# Patient Record
Sex: FEMALE | URBAN - METROPOLITAN AREA
[De-identification: names, ages, dates, MRNs, and addresses within clinical notes are randomized per-mention and may not be internally consistent; named-entity substitution may affect disease eponyms.]

---

## 2018-05-01 ENCOUNTER — OUTPATIENT (OUTPATIENT)
Dept: OUTPATIENT SERVICES | Facility: HOSPITAL | Age: 78
LOS: 1 days | End: 2018-05-01

## 2018-05-22 ENCOUNTER — INPATIENT (INPATIENT)
Facility: HOSPITAL | Age: 78
LOS: 2 days | Discharge: HOME | DRG: 392 | End: 2018-05-25
Attending: INTERNAL MEDICINE | Admitting: INTERNAL MEDICINE
Payer: MEDICARE

## 2018-05-22 VITALS
SYSTOLIC BLOOD PRESSURE: 142 MMHG | DIASTOLIC BLOOD PRESSURE: 78 MMHG | HEIGHT: 62 IN | RESPIRATION RATE: 16 BRPM | OXYGEN SATURATION: 99 % | WEIGHT: 164.91 LBS | HEART RATE: 90 BPM | TEMPERATURE: 98 F

## 2018-05-22 DIAGNOSIS — K92.2 GASTROINTESTINAL HEMORRHAGE, UNSPECIFIED: ICD-10-CM

## 2018-05-22 DIAGNOSIS — K52.9 NONINFECTIVE GASTROENTERITIS AND COLITIS, UNSPECIFIED: ICD-10-CM

## 2018-05-22 DIAGNOSIS — Z29.9 ENCOUNTER FOR PROPHYLACTIC MEASURES, UNSPECIFIED: ICD-10-CM

## 2018-05-22 DIAGNOSIS — K62.5 HEMORRHAGE OF ANUS AND RECTUM: ICD-10-CM

## 2018-05-22 DIAGNOSIS — I10 ESSENTIAL (PRIMARY) HYPERTENSION: ICD-10-CM

## 2018-05-22 LAB
ALBUMIN SERPL ELPH-MCNC: 3.3 G/DL — LOW (ref 3.5–5)
ALP SERPL-CCNC: 68 U/L — SIGNIFICANT CHANGE UP (ref 40–120)
ALT FLD-CCNC: 17 U/L DA — SIGNIFICANT CHANGE UP (ref 10–60)
ANION GAP SERPL CALC-SCNC: 5 MMOL/L — SIGNIFICANT CHANGE UP (ref 5–17)
APPEARANCE UR: CLEAR — SIGNIFICANT CHANGE UP
APTT BLD: 28 SEC — SIGNIFICANT CHANGE UP (ref 27.5–37.4)
AST SERPL-CCNC: 22 U/L — SIGNIFICANT CHANGE UP (ref 10–40)
BASOPHILS # BLD AUTO: 0.1 K/UL — SIGNIFICANT CHANGE UP (ref 0–0.2)
BASOPHILS NFR BLD AUTO: 1.4 % — SIGNIFICANT CHANGE UP (ref 0–2)
BILIRUB SERPL-MCNC: 0.4 MG/DL — SIGNIFICANT CHANGE UP (ref 0.2–1.2)
BILIRUB UR-MCNC: NEGATIVE — SIGNIFICANT CHANGE UP
BUN SERPL-MCNC: 15 MG/DL — SIGNIFICANT CHANGE UP (ref 7–18)
CALCIUM SERPL-MCNC: 9.7 MG/DL — SIGNIFICANT CHANGE UP (ref 8.4–10.5)
CHLORIDE SERPL-SCNC: 110 MMOL/L — HIGH (ref 96–108)
CO2 SERPL-SCNC: 25 MMOL/L — SIGNIFICANT CHANGE UP (ref 22–31)
COLOR SPEC: YELLOW — SIGNIFICANT CHANGE UP
CREAT SERPL-MCNC: 1.21 MG/DL — SIGNIFICANT CHANGE UP (ref 0.5–1.3)
DIFF PNL FLD: NEGATIVE — SIGNIFICANT CHANGE UP
EOSINOPHIL # BLD AUTO: 0.1 K/UL — SIGNIFICANT CHANGE UP (ref 0–0.5)
EOSINOPHIL NFR BLD AUTO: 0.7 % — SIGNIFICANT CHANGE UP (ref 0–6)
GLUCOSE SERPL-MCNC: 103 MG/DL — HIGH (ref 70–99)
GLUCOSE UR QL: NEGATIVE — SIGNIFICANT CHANGE UP
HCT VFR BLD CALC: 31.1 % — LOW (ref 34.5–45)
HCT VFR BLD CALC: 32.2 % — LOW (ref 34.5–45)
HGB BLD-MCNC: 10 G/DL — LOW (ref 11.5–15.5)
HGB BLD-MCNC: 9.7 G/DL — LOW (ref 11.5–15.5)
INR BLD: 1.07 RATIO — SIGNIFICANT CHANGE UP (ref 0.88–1.16)
KETONES UR-MCNC: NEGATIVE — SIGNIFICANT CHANGE UP
LEUKOCYTE ESTERASE UR-ACNC: NEGATIVE — SIGNIFICANT CHANGE UP
LIDOCAIN IGE QN: 133 U/L — SIGNIFICANT CHANGE UP (ref 73–393)
LYMPHOCYTES # BLD AUTO: 1.6 K/UL — SIGNIFICANT CHANGE UP (ref 1–3.3)
LYMPHOCYTES # BLD AUTO: 19.1 % — SIGNIFICANT CHANGE UP (ref 13–44)
MCHC RBC-ENTMCNC: 25.2 PG — LOW (ref 27–34)
MCHC RBC-ENTMCNC: 25.5 PG — LOW (ref 27–34)
MCHC RBC-ENTMCNC: 31.1 GM/DL — LOW (ref 32–36)
MCHC RBC-ENTMCNC: 31.1 GM/DL — LOW (ref 32–36)
MCV RBC AUTO: 81 FL — SIGNIFICANT CHANGE UP (ref 80–100)
MCV RBC AUTO: 82 FL — SIGNIFICANT CHANGE UP (ref 80–100)
MONOCYTES # BLD AUTO: 0.6 K/UL — SIGNIFICANT CHANGE UP (ref 0–0.9)
MONOCYTES NFR BLD AUTO: 7.9 % — SIGNIFICANT CHANGE UP (ref 2–14)
NEUTROPHILS # BLD AUTO: 5.8 K/UL — SIGNIFICANT CHANGE UP (ref 1.8–7.4)
NEUTROPHILS NFR BLD AUTO: 70.8 % — SIGNIFICANT CHANGE UP (ref 43–77)
NITRITE UR-MCNC: NEGATIVE — SIGNIFICANT CHANGE UP
OB PNL STL: POSITIVE
PH UR: 6.5 — SIGNIFICANT CHANGE UP (ref 5–8)
PLATELET # BLD AUTO: 286 K/UL — SIGNIFICANT CHANGE UP (ref 150–400)
PLATELET # BLD AUTO: 294 K/UL — SIGNIFICANT CHANGE UP (ref 150–400)
POTASSIUM SERPL-MCNC: 4.2 MMOL/L — SIGNIFICANT CHANGE UP (ref 3.5–5.3)
POTASSIUM SERPL-SCNC: 4.2 MMOL/L — SIGNIFICANT CHANGE UP (ref 3.5–5.3)
PROT SERPL-MCNC: 6.7 G/DL — SIGNIFICANT CHANGE UP (ref 6–8.3)
PROT UR-MCNC: NEGATIVE — SIGNIFICANT CHANGE UP
PROTHROM AB SERPL-ACNC: 11.7 SEC — SIGNIFICANT CHANGE UP (ref 9.8–12.7)
RBC # BLD: 3.79 M/UL — LOW (ref 3.8–5.2)
RBC # BLD: 3.98 M/UL — SIGNIFICANT CHANGE UP (ref 3.8–5.2)
RBC # FLD: 15.6 % — HIGH (ref 10.3–14.5)
RBC # FLD: 15.7 % — HIGH (ref 10.3–14.5)
SODIUM SERPL-SCNC: 140 MMOL/L — SIGNIFICANT CHANGE UP (ref 135–145)
SP GR SPEC: 1.01 — SIGNIFICANT CHANGE UP (ref 1.01–1.02)
UROBILINOGEN FLD QL: NEGATIVE — SIGNIFICANT CHANGE UP
WBC # BLD: 7.8 K/UL — SIGNIFICANT CHANGE UP (ref 3.8–10.5)
WBC # BLD: 8.2 K/UL — SIGNIFICANT CHANGE UP (ref 3.8–10.5)
WBC # FLD AUTO: 7.8 K/UL — SIGNIFICANT CHANGE UP (ref 3.8–10.5)
WBC # FLD AUTO: 8.2 K/UL — SIGNIFICANT CHANGE UP (ref 3.8–10.5)

## 2018-05-22 PROCEDURE — 99285 EMERGENCY DEPT VISIT HI MDM: CPT

## 2018-05-22 PROCEDURE — 74177 CT ABD & PELVIS W/CONTRAST: CPT | Mod: 26

## 2018-05-22 RX ORDER — SIMVASTATIN 20 MG/1
40 TABLET, FILM COATED ORAL AT BEDTIME
Qty: 0 | Refills: 0 | Status: DISCONTINUED | OUTPATIENT
Start: 2018-05-22 | End: 2018-05-25

## 2018-05-22 RX ORDER — LOSARTAN POTASSIUM 100 MG/1
0 TABLET, FILM COATED ORAL
Qty: 30 | Refills: 0 | COMMUNITY

## 2018-05-22 RX ORDER — CARVEDILOL PHOSPHATE 80 MG/1
25 CAPSULE, EXTENDED RELEASE ORAL EVERY 12 HOURS
Qty: 0 | Refills: 0 | Status: DISCONTINUED | OUTPATIENT
Start: 2018-05-22 | End: 2018-05-25

## 2018-05-22 RX ORDER — SODIUM CHLORIDE 9 MG/ML
1000 INJECTION INTRAMUSCULAR; INTRAVENOUS; SUBCUTANEOUS ONCE
Qty: 0 | Refills: 0 | Status: COMPLETED | OUTPATIENT
Start: 2018-05-22 | End: 2018-05-22

## 2018-05-22 RX ORDER — NABUMETONE 750 MG
0 TABLET ORAL
Qty: 60 | Refills: 0 | COMMUNITY

## 2018-05-22 RX ORDER — FENOFIBRATE,MICRONIZED 130 MG
145 CAPSULE ORAL DAILY
Qty: 0 | Refills: 0 | Status: DISCONTINUED | OUTPATIENT
Start: 2018-05-22 | End: 2018-05-25

## 2018-05-22 RX ORDER — CIPROFLOXACIN LACTATE 400MG/40ML
400 VIAL (ML) INTRAVENOUS EVERY 12 HOURS
Qty: 0 | Refills: 0 | Status: DISCONTINUED | OUTPATIENT
Start: 2018-05-22 | End: 2018-05-25

## 2018-05-22 RX ORDER — NIFEDIPINE 30 MG
0 TABLET, EXTENDED RELEASE 24 HR ORAL
Qty: 30 | Refills: 0 | COMMUNITY

## 2018-05-22 RX ORDER — METRONIDAZOLE 500 MG
500 TABLET ORAL EVERY 8 HOURS
Qty: 0 | Refills: 0 | Status: COMPLETED | OUTPATIENT
Start: 2018-05-22 | End: 2018-05-22

## 2018-05-22 RX ORDER — SODIUM CHLORIDE 9 MG/ML
1000 INJECTION INTRAMUSCULAR; INTRAVENOUS; SUBCUTANEOUS
Qty: 0 | Refills: 0 | Status: DISCONTINUED | OUTPATIENT
Start: 2018-05-22 | End: 2018-05-25

## 2018-05-22 RX ORDER — DONEPEZIL HYDROCHLORIDE 10 MG/1
10 TABLET, FILM COATED ORAL AT BEDTIME
Qty: 0 | Refills: 0 | Status: DISCONTINUED | OUTPATIENT
Start: 2018-05-22 | End: 2018-05-25

## 2018-05-22 RX ORDER — PANTOPRAZOLE SODIUM 20 MG/1
40 TABLET, DELAYED RELEASE ORAL DAILY
Qty: 0 | Refills: 0 | Status: DISCONTINUED | OUTPATIENT
Start: 2018-05-22 | End: 2018-05-24

## 2018-05-22 RX ORDER — SIMVASTATIN 20 MG/1
0 TABLET, FILM COATED ORAL
Qty: 30 | Refills: 0 | COMMUNITY

## 2018-05-22 RX ORDER — DONEPEZIL HYDROCHLORIDE 10 MG/1
0 TABLET, FILM COATED ORAL
Qty: 30 | Refills: 0 | COMMUNITY

## 2018-05-22 RX ORDER — SOD SULF/SODIUM/NAHCO3/KCL/PEG
4000 SOLUTION, RECONSTITUTED, ORAL ORAL ONCE
Qty: 0 | Refills: 0 | Status: DISCONTINUED | OUTPATIENT
Start: 2018-05-22 | End: 2018-05-22

## 2018-05-22 RX ORDER — NIFEDIPINE 30 MG
30 TABLET, EXTENDED RELEASE 24 HR ORAL DAILY
Qty: 0 | Refills: 0 | Status: DISCONTINUED | OUTPATIENT
Start: 2018-05-22 | End: 2018-05-25

## 2018-05-22 RX ORDER — FENOFIBRATE,MICRONIZED 130 MG
0 CAPSULE ORAL
Qty: 30 | Refills: 0 | COMMUNITY

## 2018-05-22 RX ORDER — ESOMEPRAZOLE MAGNESIUM 40 MG/1
0 CAPSULE, DELAYED RELEASE ORAL
Qty: 30 | Refills: 0 | COMMUNITY

## 2018-05-22 RX ORDER — GABAPENTIN 400 MG/1
0 CAPSULE ORAL
Qty: 30 | Refills: 0 | COMMUNITY

## 2018-05-22 RX ORDER — METRONIDAZOLE 500 MG
500 TABLET ORAL EVERY 8 HOURS
Qty: 0 | Refills: 0 | Status: DISCONTINUED | OUTPATIENT
Start: 2018-05-22 | End: 2018-05-25

## 2018-05-22 RX ORDER — CIPROFLOXACIN LACTATE 400MG/40ML
400 VIAL (ML) INTRAVENOUS EVERY 12 HOURS
Qty: 0 | Refills: 0 | Status: DISCONTINUED | OUTPATIENT
Start: 2018-05-22 | End: 2018-05-22

## 2018-05-22 RX ADMIN — SIMVASTATIN 40 MILLIGRAM(S): 20 TABLET, FILM COATED ORAL at 23:56

## 2018-05-22 RX ADMIN — SODIUM CHLORIDE 80 MILLILITER(S): 9 INJECTION INTRAMUSCULAR; INTRAVENOUS; SUBCUTANEOUS at 23:56

## 2018-05-22 RX ADMIN — Medication 100 MILLIGRAM(S): at 23:56

## 2018-05-22 RX ADMIN — SODIUM CHLORIDE 1000 MILLILITER(S): 9 INJECTION INTRAMUSCULAR; INTRAVENOUS; SUBCUTANEOUS at 12:40

## 2018-05-22 RX ADMIN — Medication 100 MILLIGRAM(S): at 13:48

## 2018-05-22 RX ADMIN — DONEPEZIL HYDROCHLORIDE 10 MILLIGRAM(S): 10 TABLET, FILM COATED ORAL at 23:56

## 2018-05-22 RX ADMIN — Medication 200 MILLIGRAM(S): at 18:58

## 2018-05-22 RX ADMIN — CARVEDILOL PHOSPHATE 25 MILLIGRAM(S): 80 CAPSULE, EXTENDED RELEASE ORAL at 19:15

## 2018-05-22 NOTE — ED PROVIDER NOTE - OBJECTIVE STATEMENT
77 y/o F pt with PMHx of HLD, HTN and vertigo presents to ED c/o rectal bleeding x yesterday. Pt reports that she noticed blood in toilet bowel. Pt also reports dark stool and lower abd pain. Pt states that her last nml BM was this morning. Pt denies urinary symptoms, or any other complaints.

## 2018-05-22 NOTE — H&P ADULT - HISTORY OF PRESENT ILLNESS
78 year old female from home walk with walker with history of HTN, HLD, vertigo and PSH of bilateral knee replacement on NSAID presented with BRBPR started yesterday. Pt is Uz[place] speaking and daughter at bedside translated for her. Pt stated that she had about 5 BRBPR, not mixed with stool and started feeling dizzy today. Pt has constipation at baseline without history of hemorrhoid and has hard pallet BM every 2-3 days. pt also endorsed mild generalized abd pain. Pt has no similar episodes in the past, EGD and colonoscopy done 3 years ago with normal result to patient knowledge.  Denied loss of weight, loss of appetite, chest pain, palpitation, sob, nausea, vomiting, hematemesis and any other symptoms.

## 2018-05-22 NOTE — ED ADULT NURSE REASSESSMENT NOTE - NS ED NURSE REASSESS COMMENT FT1
Received pt from ENEDELIA Salazar, pt is A&O x3, able to make needs known, denies any distress at this time. Pt is ambulatory with one person assist, skin is intact, right hand #20Ga intact. Daughter at bedside. Pt is admitted to [facility], report given to ENEDELIA Thorpe for holding.

## 2018-05-22 NOTE — ED PROVIDER NOTE - PROGRESS NOTE DETAILS
CT shows colitis.  pt reprots another bloody bowel movement while in ED. Hemoglobin  9.7 dwon from 12.7 in February.  Will admit for rectal bleeding.

## 2018-05-22 NOTE — H&P ADULT - NSHPREVIEWOFSYSTEMS_GEN_ALL_CORE
REVIEW OF SYSTEMS:    CONSTITUTIONAL: No weakness, fevers or chills  EYES/ENT: No visual changes;  No vertigo or throat pain   NECK: No pain or stiffness  RESPIRATORY: No cough, wheezing, hemoptysis; No shortness of breath  CARDIOVASCULAR: No chest pain or palpitations  GASTROINTESTINAL: mild diffuse pain. No nausea, vomiting, or hematemesis; No diarrhea or constipation. BRBPR+  GENITOURINARY: No dysuria, frequency or hematuria  NEUROLOGICAL: No numbness or weakness  SKIN: No itching, rashes  No other complaint except mentioned as above.

## 2018-05-22 NOTE — H&P ADULT - ASSESSMENT
78 year old female from home walk with walker with history of HTN, HLD, vertigo and PSH of bilateral knee replacement on NSAID presented with BRBPR started yesterday.

## 2018-05-22 NOTE — H&P ADULT - PROBLEM SELECTOR PLAN 4
IMPROVE VTE Individual Risk Assessment    RISK                                                          Points  [] Previous VTE                                           3  [] Thrombophilia                                        2  [] Lower limb paralysis                              2   [] Current Cancer                                       2   [x] Immobilization > 24 hrs                        1  [] ICU/CCU stay > 24 hours                       1  [x] Age > 60                                                   1    IMPROVE VTE Score: 2  can not use chemical DVT PPx as pt is actively bleeding.

## 2018-05-22 NOTE — H&P ADULT - PROBLEM SELECTOR PLAN 1
-likely diverticular bleed and/or r/o malignancy  -occult positive and fresh blood seen  -baseline Hb 12 and current Hb 9  -cbc Q6, NPO, IVF, Protonix  -Dr Jaxon GARCIA with plan for colonoscopy

## 2018-05-22 NOTE — H&P ADULT - NSHPPHYSICALEXAM_GEN_ALL_CORE
PHYSICAL EXAM:    GENERAL: NAD, well-developed    HEAD:  Atraumatic, Normocephalic    EYES: EOMI, PERRLA, conjunctiva and sclera clear    NECK: Supple, No JVD    CHEST/LUNG: Clear to auscultation bilaterally; No wheeze    HEART: Regular rate and rhythm; No murmurs, rubs, or gallops    ABDOMEN: Soft, very minimally tender, Nondistended; Bowel sounds present    EXTREMITIES:  2+ Peripheral Pulses, No clubbing, cyanosis, or edema    PSYCH: AAOx3    NEUROLOGY: non-focal    SKIN: No rashes or lesions    No other pertinent positive finding except mentioned as above.

## 2018-05-22 NOTE — CONSULT NOTE ADULT - SUBJECTIVE AND OBJECTIVE BOX
[  ] STAT REQUEST              [ X ] ROUTINE REQUEST    Patient is a 78 year old female with rectal bleeding. GI consulted to evaluate.      HPI:  78 year old female with multiple medical problems including B/L knee replacement on NSAID presented with abdominal pain and rectal bleeding.  Patient denies hematemesis, melena, fever, chills, Sob, chest pain, palpitation, hematuria, dysuria.     PAIN MANAGEMENT:  Pain Scale:                2-3 /10  Pain Location:  Abdominal cramps    Prior Colonoscopy:  3 years ago    PAST MEDICAL HISTORY  HLD (hyperlipidemia)  HTN (hypertension)  Vertigo      PAST SURGICAL HISTORY  No significant past surgical history      Allergies    No Known Allergies         MEDICATIONS  (STANDING):  carvedilol 25 milliGRAM(s) Oral every 12 hours  ciprofloxacin   IVPB 400 milliGRAM(s) IV Intermittent every 12 hours  donepezil 10 milliGRAM(s) Oral at bedtime  fenofibrate Tablet 145 milliGRAM(s) Oral daily  metroNIDAZOLE  IVPB 500 milliGRAM(s) IV Intermittent every 8 hours  NIFEdipine XL 30 milliGRAM(s) Oral daily  pantoprazole  Injectable 40 milliGRAM(s) IV Push daily  simvastatin 40 milliGRAM(s) Oral at bedtime  sodium chloride 0.9%. 1000 milliLiter(s) (80 mL/Hr) IV Continuous <Continuous>         SOCIAL HISTORY  Advanced Directives:       [ X ] Full Code       [  ] DNR  Marital Status:         [ X ] M      [  ] S      [  ] D       [  ] W  Children:       [ X ] Yes      [  ] No  Occupation:        [  ] Employed       [ X ] Unemployed       [  ] Retired  Diet:       [ X ] Regular       [  ] PEG feeding          [  ] NG tube feeding  Drug Use:           [ X ] Patient denied          [  ] Yes  Alcohol:           [ X ] No             [  ] Yes (socially)         [  ] Yes (chronic)  Tobacco:           [  ] Yes           [ X ] No        FAMILY HISTORY  [ X ] Heart Disease            [  ] Diabetes             [  ] HTN             [  ] Colon Cancer             [  ] Stomach Cancer              [  ] Pancreatic Cancer        VITAL SIGNS   Vital Signs Last 24 Hrs  T(C): 36.9 (22 May 2018 16:09), Max: 36.9 (22 May 2018 08:57)  T(F): 98.5 (22 May 2018 16:09), Max: 98.5 (22 May 2018 08:57)  HR: 84 (22 May 2018 16:09) (74 - 90)  BP: 124/58 (22 May 2018 16:09) (124/58 - 142/78)   RR: 18 (22 May 2018 16:09) (16 - 18)  SpO2: 99% (22 May 2018 16:09) (98% - 99%)  Daily Height in cm: 157.48 (22 May 2018 08:57)              CBC Full  -  ( 22 May 2018 10:51 )  WBC Count : 8.2 K/uL  Hemoglobin : 9.7 g/dL  Hematocrit : 31.1 %  Platelet Count - Automated : 286 K/uL  Mean Cell Volume : 82.0 fl  Mean Cell Hemoglobin : 25.5 pg  Mean Cell Hemoglobin Concentration : 31.1 gm/dL  Auto Neutrophil # : 5.8 K/uL  Auto Lymphocyte # : 1.6 K/uL  Auto Monocyte # : 0.6 K/uL  Auto Eosinophil # : 0.1 K/uL  Auto Basophil # : 0.1 K/uL  Auto Neutrophil % : 70.8 %  Auto Lymphocyte % : 19.1 %  Auto Monocyte % : 7.9 %  Auto Eosinophil % : 0.7 %  Auto Basophil % : 1.4 %      05-22    140  |  110<H>  |  15  ----------------------------<  103<H>  4.2   |  25  |  1.21    Ca    9.7      22 May 2018 10:51    TPro  6.7  /  Alb  3.3<L>  /  TBili  0.4  /  DBili  x   /  AST  22  /  ALT  17  /  AlkPhos  68  05-22    Lipase, Serum: 133 U/L (05-22 @ 10:51)    PT/INR - ( 22 May 2018 10:51 )   PT: 11.7 sec;   INR: 1.07 ratio       PTT - ( 22 May 2018 10:51 )  PTT:28.0 sec      Occult Blood, Feces (05.22.18 @ 10:00)    Occult Blood, Feces: Positive    Urinalysis (05.22.18 @ 11:07)    pH Urine: 6.5    Glucose Qualitative, Urine: Negative    Blood, Urine: Negative    Color: Yellow    Urine Appearance: Clear    Bilirubin: Negative    Ketone - Urine: Negative    Specific Gravity: 1.010    Protein, Urine: Negative    Urobilinogen: Negative    Nitrite: Negative    Leukocyte Esterase Concentration: Negative      ECG   Ventricular Rate 69 BPM    Atrial Rate 69 BPM    P-R Interval 188 ms    QRS Duration 100 ms     ms    QTc 418 ms    P Axis 59 degrees    R Axis -9 degrees    T Axis 15 degrees    Diagnosis Line Normal sinus rhythm  Normal ECG                 EXAM:  CT ABDOMEN AND PELVIS IC                            PROCEDURE DATE:  05/22/2018          INTERPRETATION:  CLINICAL STATEMENT: suprapubic/LLQ pain with rectal   bleeding    TECHNIQUE: CT of the abdomen and pelvis was performed with IV contrast.   No oral contrast administered. Approximately 95 cc of Omnipaque 350   administered.    COMPARISON: None.    FINDINGS:      The liver, gallbladder are unremarkable.    The spleen, pancreas,     are unremarkable. Nonspecific adrenal   thickening noted      1.8 cm cyst right kidney. Smaller renal hypodensities too small to   characterize.    Evaluation of bowel limited without oral contrast. There is no dilatation   of the bowel.  Colonic diverticulosis. Large hiatal hernia noted.    There is long segment wall thickening involving descending colon with   pericolonic inflammatory stranding and trace free fluid.        There is no intraperitoneal free air.         Small fat-containing umbilical hernia.    Urinary bladder grossly unremarkable. Small calcification noted in in the   adnexa.    IMPRESSION:  Colitis involving the descending colon . No bowel obstruction.       EXAM:  CHEST-PORTABLE STAT        PROCEDURE DATE:  02/18/2016      INTERPRETATION:  AP view of the chest dated 2/18/2016.    CLINICAL INFORMATION: Dizziness.    No prior studies are available for comparison.    FINDINGS:    The lungs are clear without focal consolidation. The hemidiaphragms are   sharp; there is no evidence of a pleural effusion. The cardia mediastinal   silhouette is exaggerated by AP technique. There are multilevel   degenerative changes of the spine.    IMPRESSION: No acutefindings.

## 2018-05-22 NOTE — H&P ADULT - NSHPLABSRESULTS_GEN_ALL_CORE
Vital Signs Last 24 Hrs  T(C): 36.7 (22 May 2018 11:12), Max: 36.9 (22 May 2018 08:57)  T(F): 98 (22 May 2018 11:12), Max: 98.5 (22 May 2018 08:57)  HR: 74 (22 May 2018 11:12) (74 - 90)  BP: 141/60 (22 May 2018 11:12) (141/60 - 142/78)  BP(mean): --  RR: 16 (22 May 2018 11:12) (16 - 16)  SpO2: 98% (22 May 2018 11:12) (98% - 99%)      Labs:                        9.7    8.2   )-----------( 286      ( 22 May 2018 10:51 )             31.1     05-22    140  |  110<H>  |  15  ----------------------------<  103<H>  4.2   |  25  |  1.21    Ca    9.7      22 May 2018 10:51    TPro  6.7  /  Alb  3.3<L>  /  TBili  0.4  /  DBili  x   /  AST  22  /  ALT  17  /  AlkPhos  68  05-22

## 2018-05-22 NOTE — CONSULT NOTE ADULT - ASSESSMENT
A. The etiology for abdominal pain and rectal bleeding in this patient is most likely due to:  1. Ischemic colitis  2. Infectious colitis  3. NSAID induced colitis / ulcer    B. Anemia    Suggestions:    1. Monitor H/H closely  2. Transfuse PRBC as needed  3. Protonix daily  4. Avoid NSAID  5. Antibiotics IV  6. DVT prophylaxis

## 2018-05-22 NOTE — CONSULT NOTE ADULT - NEGATIVE ENMT SYMPTOMS
no ear pain/no nose bleeds/no dry mouth/no hearing difficulty/no gum bleeding/no throat pain/no dysphagia

## 2018-05-23 DIAGNOSIS — R69 ILLNESS, UNSPECIFIED: ICD-10-CM

## 2018-05-23 LAB
ANION GAP SERPL CALC-SCNC: 9 MMOL/L — SIGNIFICANT CHANGE UP (ref 5–17)
BASOPHILS # BLD AUTO: 0.1 K/UL — SIGNIFICANT CHANGE UP (ref 0–0.2)
BASOPHILS NFR BLD AUTO: 0.8 % — SIGNIFICANT CHANGE UP (ref 0–2)
BUN SERPL-MCNC: 11 MG/DL — SIGNIFICANT CHANGE UP (ref 7–18)
CALCIUM SERPL-MCNC: 9.4 MG/DL — SIGNIFICANT CHANGE UP (ref 8.4–10.5)
CHLORIDE SERPL-SCNC: 109 MMOL/L — HIGH (ref 96–108)
CHOLEST SERPL-MCNC: 129 MG/DL — SIGNIFICANT CHANGE UP (ref 10–199)
CO2 SERPL-SCNC: 23 MMOL/L — SIGNIFICANT CHANGE UP (ref 22–31)
CREAT SERPL-MCNC: 1.11 MG/DL — SIGNIFICANT CHANGE UP (ref 0.5–1.3)
EOSINOPHIL # BLD AUTO: 0.1 K/UL — SIGNIFICANT CHANGE UP (ref 0–0.5)
EOSINOPHIL NFR BLD AUTO: 1.1 % — SIGNIFICANT CHANGE UP (ref 0–6)
FERRITIN SERPL-MCNC: 20 NG/ML — SIGNIFICANT CHANGE UP (ref 15–150)
FOLATE SERPL-MCNC: 15.9 NG/ML — SIGNIFICANT CHANGE UP
GLUCOSE SERPL-MCNC: 103 MG/DL — HIGH (ref 70–99)
HBA1C BLD-MCNC: 6.1 % — HIGH (ref 4–5.6)
HCT VFR BLD CALC: 30.3 % — LOW (ref 34.5–45)
HCT VFR BLD CALC: 30.8 % — LOW (ref 34.5–45)
HCT VFR BLD CALC: 32.7 % — LOW (ref 34.5–45)
HDLC SERPL-MCNC: 52 MG/DL — SIGNIFICANT CHANGE UP (ref 40–125)
HGB BLD-MCNC: 9.1 G/DL — LOW (ref 11.5–15.5)
HGB BLD-MCNC: 9.3 G/DL — LOW (ref 11.5–15.5)
HGB BLD-MCNC: 9.7 G/DL — LOW (ref 11.5–15.5)
IRON SATN MFR SERPL: 26 UG/DL — LOW (ref 40–150)
IRON SATN MFR SERPL: 7 % — LOW (ref 15–50)
LDH SERPL L TO P-CCNC: 150 U/L — SIGNIFICANT CHANGE UP (ref 120–225)
LIPID PNL WITH DIRECT LDL SERPL: 61 MG/DL — SIGNIFICANT CHANGE UP
LYMPHOCYTES # BLD AUTO: 1.4 K/UL — SIGNIFICANT CHANGE UP (ref 1–3.3)
LYMPHOCYTES # BLD AUTO: 20.4 % — SIGNIFICANT CHANGE UP (ref 13–44)
MAGNESIUM SERPL-MCNC: 1.8 MG/DL — SIGNIFICANT CHANGE UP (ref 1.6–2.6)
MCHC RBC-ENTMCNC: 24.4 PG — LOW (ref 27–34)
MCHC RBC-ENTMCNC: 24.4 PG — LOW (ref 27–34)
MCHC RBC-ENTMCNC: 24.6 PG — LOW (ref 27–34)
MCHC RBC-ENTMCNC: 29.6 GM/DL — LOW (ref 32–36)
MCHC RBC-ENTMCNC: 30.1 GM/DL — LOW (ref 32–36)
MCHC RBC-ENTMCNC: 30.1 GM/DL — LOW (ref 32–36)
MCV RBC AUTO: 81 FL — SIGNIFICANT CHANGE UP (ref 80–100)
MCV RBC AUTO: 81.8 FL — SIGNIFICANT CHANGE UP (ref 80–100)
MCV RBC AUTO: 82.4 FL — SIGNIFICANT CHANGE UP (ref 80–100)
MONOCYTES # BLD AUTO: 0.6 K/UL — SIGNIFICANT CHANGE UP (ref 0–0.9)
MONOCYTES NFR BLD AUTO: 8.4 % — SIGNIFICANT CHANGE UP (ref 2–14)
NEUTROPHILS # BLD AUTO: 4.9 K/UL — SIGNIFICANT CHANGE UP (ref 1.8–7.4)
NEUTROPHILS NFR BLD AUTO: 69.3 % — SIGNIFICANT CHANGE UP (ref 43–77)
PHOSPHATE SERPL-MCNC: 3 MG/DL — SIGNIFICANT CHANGE UP (ref 2.5–4.5)
PLATELET # BLD AUTO: 234 K/UL — SIGNIFICANT CHANGE UP (ref 150–400)
PLATELET # BLD AUTO: 241 K/UL — SIGNIFICANT CHANGE UP (ref 150–400)
PLATELET # BLD AUTO: 251 K/UL — SIGNIFICANT CHANGE UP (ref 150–400)
POTASSIUM SERPL-MCNC: 3.7 MMOL/L — SIGNIFICANT CHANGE UP (ref 3.5–5.3)
POTASSIUM SERPL-SCNC: 3.7 MMOL/L — SIGNIFICANT CHANGE UP (ref 3.5–5.3)
RBC # BLD: 3.7 M/UL — LOW (ref 3.8–5.2)
RBC # BLD: 3.73 M/UL — LOW (ref 3.8–5.2)
RBC # BLD: 3.8 M/UL — SIGNIFICANT CHANGE UP (ref 3.8–5.2)
RBC # BLD: 3.97 M/UL — SIGNIFICANT CHANGE UP (ref 3.8–5.2)
RBC # FLD: 15.4 % — HIGH (ref 10.3–14.5)
RBC # FLD: 15.7 % — HIGH (ref 10.3–14.5)
RBC # FLD: 15.8 % — HIGH (ref 10.3–14.5)
RETICS #: 77.6 K/UL — SIGNIFICANT CHANGE UP (ref 25–125)
RETICS/RBC NFR: 2.1 % — SIGNIFICANT CHANGE UP (ref 0.5–2.5)
SODIUM SERPL-SCNC: 141 MMOL/L — SIGNIFICANT CHANGE UP (ref 135–145)
TIBC SERPL-MCNC: 384 UG/DL — SIGNIFICANT CHANGE UP (ref 250–450)
TOTAL CHOLESTEROL/HDL RATIO MEASUREMENT: 2.5 RATIO — LOW (ref 3.3–7.1)
TRIGL SERPL-MCNC: 80 MG/DL — SIGNIFICANT CHANGE UP (ref 10–149)
TSH SERPL-MCNC: 2.38 UU/ML — SIGNIFICANT CHANGE UP (ref 0.34–4.82)
UIBC SERPL-MCNC: 358 UG/DL — SIGNIFICANT CHANGE UP (ref 110–370)
VIT B12 SERPL-MCNC: 454 PG/ML — SIGNIFICANT CHANGE UP (ref 232–1245)
WBC # BLD: 6.6 K/UL — SIGNIFICANT CHANGE UP (ref 3.8–10.5)
WBC # BLD: 6.9 K/UL — SIGNIFICANT CHANGE UP (ref 3.8–10.5)
WBC # BLD: 7.1 K/UL — SIGNIFICANT CHANGE UP (ref 3.8–10.5)
WBC # FLD AUTO: 6.6 K/UL — SIGNIFICANT CHANGE UP (ref 3.8–10.5)
WBC # FLD AUTO: 6.9 K/UL — SIGNIFICANT CHANGE UP (ref 3.8–10.5)
WBC # FLD AUTO: 7.1 K/UL — SIGNIFICANT CHANGE UP (ref 3.8–10.5)

## 2018-05-23 RX ADMIN — Medication 30 MILLIGRAM(S): at 06:59

## 2018-05-23 RX ADMIN — Medication 200 MILLIGRAM(S): at 17:23

## 2018-05-23 RX ADMIN — Medication 100 MILLIGRAM(S): at 13:18

## 2018-05-23 RX ADMIN — PANTOPRAZOLE SODIUM 40 MILLIGRAM(S): 20 TABLET, DELAYED RELEASE ORAL at 11:22

## 2018-05-23 RX ADMIN — CARVEDILOL PHOSPHATE 25 MILLIGRAM(S): 80 CAPSULE, EXTENDED RELEASE ORAL at 17:23

## 2018-05-23 RX ADMIN — Medication 100 MILLIGRAM(S): at 05:18

## 2018-05-23 RX ADMIN — DONEPEZIL HYDROCHLORIDE 10 MILLIGRAM(S): 10 TABLET, FILM COATED ORAL at 21:32

## 2018-05-23 RX ADMIN — Medication 100 MILLIGRAM(S): at 21:33

## 2018-05-23 RX ADMIN — CARVEDILOL PHOSPHATE 25 MILLIGRAM(S): 80 CAPSULE, EXTENDED RELEASE ORAL at 05:23

## 2018-05-23 RX ADMIN — SIMVASTATIN 40 MILLIGRAM(S): 20 TABLET, FILM COATED ORAL at 21:32

## 2018-05-23 RX ADMIN — Medication 145 MILLIGRAM(S): at 11:22

## 2018-05-23 RX ADMIN — Medication 200 MILLIGRAM(S): at 05:18

## 2018-05-23 RX ADMIN — SODIUM CHLORIDE 80 MILLILITER(S): 9 INJECTION INTRAMUSCULAR; INTRAVENOUS; SUBCUTANEOUS at 05:22

## 2018-05-23 NOTE — ED ADULT NURSE REASSESSMENT NOTE - NS ED NURSE REASSESS COMMENT FT1
assumed care of pt from previous RN Cal. pt a&ox3, admitted for rectal bleed. 22G left forearm. offers no complaints at this time. will continue to monitor and provide care as needed.

## 2018-05-23 NOTE — PROGRESS NOTE ADULT - SUBJECTIVE AND OBJECTIVE BOX
[   ] ICU                                          [   ] CCU                                      [ X  ] Medical Floor    Patient is comfortable. No new complaints reported, No abdominal pain, N/V, hematemesis, hematochezia, melena, fever, chills, chest pain, SOB, cough or diarrhea reported.    VITALS  Vital Signs Last 24 Hrs  T(C): 36.6 (23 May 2018 10:59), Max: 37.2 (22 May 2018 23:45)  T(F): 97.9 (23 May 2018 10:59), Max: 99 (22 May 2018 23:45)  HR: 83 (23 May 2018 10:59) (73 - 105)  BP: 133/59 (23 May 2018 10:59) (118/56 - 150/85)   RR: 18 (23 May 2018 10:59) (18 - 18)  SpO2: 99% (23 May 2018 10:59) (97% - 100%)       MEDICATIONS  (STANDING):  carvedilol 25 milliGRAM(s) Oral every 12 hours  ciprofloxacin   IVPB 400 milliGRAM(s) IV Intermittent every 12 hours  donepezil 10 milliGRAM(s) Oral at bedtime  fenofibrate Tablet 145 milliGRAM(s) Oral daily  metroNIDAZOLE  IVPB 500 milliGRAM(s) IV Intermittent every 8 hours  NIFEdipine XL 30 milliGRAM(s) Oral daily  pantoprazole  Injectable 40 milliGRAM(s) IV Push daily  simvastatin 40 milliGRAM(s) Oral at bedtime  sodium chloride 0.9%. 1000 milliLiter(s) (80 mL/Hr) IV Continuous <Continuous>    MEDICATIONS  (PRN):                            9.1    6.6   )-----------( 234      ( 23 May 2018 12:37 )             30.3       05-23    141  |  109<H>  |  11  ----------------------------<  103<H>  3.7   |  23  |  1.11    Ca    9.4      23 May 2018 06:29  Phos  3.0     05-23  Mg     1.8     05-23    TPro  6.7  /  Alb  3.3<L>  /  TBili  0.4  /  DBili  x   /  AST  22  /  ALT  17  /  AlkPhos  68  05-22      PT/INR - ( 22 May 2018 10:51 )   PT: 11.7 sec;   INR: 1.07 ratio         PTT - ( 22 May 2018 10:51 )  PTT:28.0 sec

## 2018-05-23 NOTE — ED ADULT NURSE NOTE - ED STAT RN HANDOFF DETAILS
report given to ENEDELIA Osei on [address] [place] room 402 in stable condition for continuation of care. pt a&ox3, admitted for rectal bleed. 22G left forearm. ambulatory.

## 2018-05-24 LAB
ANION GAP SERPL CALC-SCNC: 6 MMOL/L — SIGNIFICANT CHANGE UP (ref 5–17)
BUN SERPL-MCNC: 8 MG/DL — SIGNIFICANT CHANGE UP (ref 7–18)
CALCIUM SERPL-MCNC: 9.4 MG/DL — SIGNIFICANT CHANGE UP (ref 8.4–10.5)
CHLORIDE SERPL-SCNC: 109 MMOL/L — HIGH (ref 96–108)
CO2 SERPL-SCNC: 27 MMOL/L — SIGNIFICANT CHANGE UP (ref 22–31)
CREAT SERPL-MCNC: 0.99 MG/DL — SIGNIFICANT CHANGE UP (ref 0.5–1.3)
GLUCOSE SERPL-MCNC: 100 MG/DL — HIGH (ref 70–99)
HCT VFR BLD CALC: 30.3 % — LOW (ref 34.5–45)
HGB BLD-MCNC: 9.3 G/DL — LOW (ref 11.5–15.5)
MCHC RBC-ENTMCNC: 25.1 PG — LOW (ref 27–34)
MCHC RBC-ENTMCNC: 30.7 GM/DL — LOW (ref 32–36)
MCV RBC AUTO: 81.9 FL — SIGNIFICANT CHANGE UP (ref 80–100)
PLATELET # BLD AUTO: 252 K/UL — SIGNIFICANT CHANGE UP (ref 150–400)
POTASSIUM SERPL-MCNC: 3.4 MMOL/L — LOW (ref 3.5–5.3)
POTASSIUM SERPL-SCNC: 3.4 MMOL/L — LOW (ref 3.5–5.3)
RBC # BLD: 3.7 M/UL — LOW (ref 3.8–5.2)
RBC # FLD: 15.6 % — HIGH (ref 10.3–14.5)
SODIUM SERPL-SCNC: 142 MMOL/L — SIGNIFICANT CHANGE UP (ref 135–145)
WBC # BLD: 4.9 K/UL — SIGNIFICANT CHANGE UP (ref 3.8–10.5)
WBC # FLD AUTO: 4.9 K/UL — SIGNIFICANT CHANGE UP (ref 3.8–10.5)

## 2018-05-24 RX ORDER — SENNA PLUS 8.6 MG/1
2 TABLET ORAL AT BEDTIME
Qty: 0 | Refills: 0 | Status: DISCONTINUED | OUTPATIENT
Start: 2018-05-24 | End: 2018-05-25

## 2018-05-24 RX ORDER — POLYETHYLENE GLYCOL 3350 17 G/17G
17 POWDER, FOR SOLUTION ORAL ONCE
Qty: 0 | Refills: 0 | Status: COMPLETED | OUTPATIENT
Start: 2018-05-24 | End: 2018-05-24

## 2018-05-24 RX ORDER — DOCUSATE SODIUM 100 MG
100 CAPSULE ORAL THREE TIMES A DAY
Qty: 0 | Refills: 0 | Status: DISCONTINUED | OUTPATIENT
Start: 2018-05-24 | End: 2018-05-25

## 2018-05-24 RX ORDER — POTASSIUM CHLORIDE 20 MEQ
40 PACKET (EA) ORAL ONCE
Qty: 0 | Refills: 0 | Status: DISCONTINUED | OUTPATIENT
Start: 2018-05-24 | End: 2018-05-25

## 2018-05-24 RX ORDER — PANTOPRAZOLE SODIUM 20 MG/1
40 TABLET, DELAYED RELEASE ORAL
Qty: 0 | Refills: 0 | Status: DISCONTINUED | OUTPATIENT
Start: 2018-05-24 | End: 2018-05-25

## 2018-05-24 RX ADMIN — POLYETHYLENE GLYCOL 3350 17 GRAM(S): 17 POWDER, FOR SOLUTION ORAL at 12:56

## 2018-05-24 RX ADMIN — Medication 30 MILLIGRAM(S): at 06:28

## 2018-05-24 RX ADMIN — Medication 100 MILLIGRAM(S): at 21:21

## 2018-05-24 RX ADMIN — DONEPEZIL HYDROCHLORIDE 10 MILLIGRAM(S): 10 TABLET, FILM COATED ORAL at 21:21

## 2018-05-24 RX ADMIN — Medication 100 MILLIGRAM(S): at 17:41

## 2018-05-24 RX ADMIN — Medication 200 MILLIGRAM(S): at 06:29

## 2018-05-24 RX ADMIN — Medication 200 MILLIGRAM(S): at 17:42

## 2018-05-24 RX ADMIN — SENNA PLUS 2 TABLET(S): 8.6 TABLET ORAL at 21:21

## 2018-05-24 RX ADMIN — Medication 145 MILLIGRAM(S): at 12:56

## 2018-05-24 RX ADMIN — CARVEDILOL PHOSPHATE 25 MILLIGRAM(S): 80 CAPSULE, EXTENDED RELEASE ORAL at 17:42

## 2018-05-24 RX ADMIN — SIMVASTATIN 40 MILLIGRAM(S): 20 TABLET, FILM COATED ORAL at 21:21

## 2018-05-24 RX ADMIN — CARVEDILOL PHOSPHATE 25 MILLIGRAM(S): 80 CAPSULE, EXTENDED RELEASE ORAL at 06:29

## 2018-05-24 RX ADMIN — Medication 100 MILLIGRAM(S): at 06:29

## 2018-05-24 RX ADMIN — Medication 100 MILLIGRAM(S): at 06:28

## 2018-05-24 RX ADMIN — Medication 100 MILLIGRAM(S): at 14:15

## 2018-05-24 NOTE — PROGRESS NOTE ADULT - SUBJECTIVE AND OBJECTIVE BOX
Patient is a 78y old  Female who presents with a chief complaint of BRBPR (22 May 2018 14:21)    pt seen in icu [  ], reg med floor [   ], bed [  ], chair at bedside [   ], a+o x3 [  ], lethargic [  ],  nad [  ]    zhao [  ], ngt [  ], peg [  ], et tube [  ], cent line [  ], picc line [  ]        Allergies    No Known Allergies        Vitals    T(F): 97.9 (05-24-18 @ 05:51), Max: 98.9 (05-23-18 @ 16:45)  HR: 88 (05-24-18 @ 05:51) (74 - 88)  BP: 143/76 (05-24-18 @ 05:51) (132/66 - 143/76)  RR: 16 (05-24-18 @ 05:51) (16 - 17)  SpO2: 100% (05-24-18 @ 05:51) (96% - 100%)  Wt(kg): --  CAPILLARY BLOOD GLUCOSE          Labs                          9.3    4.9   )-----------( 252      ( 24 May 2018 06:58 )             30.3       05-24    142  |  109<H>  |  8   ----------------------------<  100<H>  3.4<L>   |  27  |  0.99    Ca    9.4      24 May 2018 06:58  Phos  3.0     05-23  Mg     1.8     05-23                  Radiology Results      Meds    MEDICATIONS  (STANDING):  carvedilol 25 milliGRAM(s) Oral every 12 hours  ciprofloxacin   IVPB 400 milliGRAM(s) IV Intermittent every 12 hours  docusate sodium 100 milliGRAM(s) Oral three times a day  donepezil 10 milliGRAM(s) Oral at bedtime  fenofibrate Tablet 145 milliGRAM(s) Oral daily  metroNIDAZOLE  IVPB 500 milliGRAM(s) IV Intermittent every 8 hours  NIFEdipine XL 30 milliGRAM(s) Oral daily  pantoprazole    Tablet 40 milliGRAM(s) Oral before breakfast  polyethylene glycol 3350 17 Gram(s) Oral once  potassium chloride    Tablet ER 40 milliEquivalent(s) Oral once  senna 2 Tablet(s) Oral at bedtime  simvastatin 40 milliGRAM(s) Oral at bedtime  sodium chloride 0.9%. 1000 milliLiter(s) (80 mL/Hr) IV Continuous <Continuous>      MEDICATIONS  (PRN):      Physical Exam    Neuro :  no focal deficits  Respiratory: CTA B/L  CV: RRR, S1S2, no murmurs,   Abdominal: Soft, NT, ND +BS,  Extremities: No edema, + peripheral pulses    ASSESSMENT    Hemorrhage of rectum and anus  HLD (hyperlipidemia)  HTN (hypertension)  Vertigo  No significant past surgical history      PLAN Patient is a 78y old  Female who presents with a chief complaint of BRBPR (22 May 2018 14:21)    pt seen in icu [  ], reg med floor [ x  ], bed [ x ], chair at bedside [   ], a+o x3 [ x ], lethargic [  ],  nad [ x ]    states feeling better        Allergies    No Known Allergies        Vitals    T(F): 97.9 (05-24-18 @ 05:51), Max: 98.9 (05-23-18 @ 16:45)  HR: 88 (05-24-18 @ 05:51) (74 - 88)  BP: 143/76 (05-24-18 @ 05:51) (132/66 - 143/76)  RR: 16 (05-24-18 @ 05:51) (16 - 17)  SpO2: 100% (05-24-18 @ 05:51) (96% - 100%)  Wt(kg): --  CAPILLARY BLOOD GLUCOSE          Labs                          9.3    4.9   )-----------( 252      ( 24 May 2018 06:58 )             30.3       05-24    142  |  109<H>  |  8   ----------------------------<  100<H>  3.4<L>   |  27  |  0.99    Ca    9.4      24 May 2018 06:58  Phos  3.0     05-23  Mg     1.8     05-23                  Radiology Results      Meds    MEDICATIONS  (STANDING):  carvedilol 25 milliGRAM(s) Oral every 12 hours  ciprofloxacin   IVPB 400 milliGRAM(s) IV Intermittent every 12 hours  docusate sodium 100 milliGRAM(s) Oral three times a day  donepezil 10 milliGRAM(s) Oral at bedtime  fenofibrate Tablet 145 milliGRAM(s) Oral daily  metroNIDAZOLE  IVPB 500 milliGRAM(s) IV Intermittent every 8 hours  NIFEdipine XL 30 milliGRAM(s) Oral daily  pantoprazole    Tablet 40 milliGRAM(s) Oral before breakfast  polyethylene glycol 3350 17 Gram(s) Oral once  potassium chloride    Tablet ER 40 milliEquivalent(s) Oral once  senna 2 Tablet(s) Oral at bedtime  simvastatin 40 milliGRAM(s) Oral at bedtime  sodium chloride 0.9%. 1000 milliLiter(s) (80 mL/Hr) IV Continuous <Continuous>      MEDICATIONS  (PRN):      Physical Exam    Neuro :  no focal deficits  Respiratory: CTA B/L  CV: RRR, S1S2, no murmurs,   Abdominal: Soft, ND +BS, left lower quad tender to moderate palp  Extremities: No edema, + peripheral pulses    ASSESSMENT    brbpr  colitis  h/o HLD (hyperlipidemia)  HTN (hypertension)  Vertigo  No significant past surgical history      PLAN    cont cipro and flagyl  gi cons noted  pt tolerating clears   adv to soft diet  colonoscopy in 6-8 weeks   pt much improved  cont current meds  d/c plan for am if tolerating diet and stable

## 2018-05-24 NOTE — PROGRESS NOTE ADULT - PROBLEM SELECTOR PLAN 1
-likely diverticular bleed and/or r/o malignancy  -occult positive and fresh blood seen  -baseline Hb 12 and current Hb 9, stable, no further episode of bleeding  -Dr Coates GI eval done, c/w ppi, outpatient colonoscopy in 6-8 weeks once colitis resolves.   - transfuse prn hg <7

## 2018-05-24 NOTE — PROGRESS NOTE ADULT - PROBLEM SELECTOR PLAN 2
CT abd with mild colitis  started on Cipro and Flagyl and IVF, tolerating clear diet, advance to soft today

## 2018-05-24 NOTE — PROGRESS NOTE ADULT - SUBJECTIVE AND OBJECTIVE BOX
NP Note discussed with  Primary Attending    Patient is a 78y old  Female who presents with a chief complaint of BRBPR (22 May 2018 14:21) Admitted with colitis on iv abx, h/h stable, no further brbpr. Gi eval done, to have outpatient colonoscopy in 6-8 weeks. currently patient c/o constipation.      INTERVAL HPI/OVERNIGHT EVENTS: no bm x2 days.    MEDICATIONS  (STANDING):  carvedilol 25 milliGRAM(s) Oral every 12 hours  ciprofloxacin   IVPB 400 milliGRAM(s) IV Intermittent every 12 hours  docusate sodium 100 milliGRAM(s) Oral three times a day  donepezil 10 milliGRAM(s) Oral at bedtime  fenofibrate Tablet 145 milliGRAM(s) Oral daily  metroNIDAZOLE  IVPB 500 milliGRAM(s) IV Intermittent every 8 hours  NIFEdipine XL 30 milliGRAM(s) Oral daily  pantoprazole    Tablet 40 milliGRAM(s) Oral before breakfast  potassium chloride    Tablet ER 40 milliEquivalent(s) Oral once  senna 2 Tablet(s) Oral at bedtime  simvastatin 40 milliGRAM(s) Oral at bedtime  sodium chloride 0.9%. 1000 milliLiter(s) (80 mL/Hr) IV Continuous <Continuous>    MEDICATIONS  (PRN):      __________________________________________________  REVIEW OF SYSTEMS:    CONSTITUTIONAL: No fever,   EYES: no acute visual disturbances  NECK: No pain or stiffness  RESPIRATORY: No cough; No shortness of breath  CARDIOVASCULAR: No chest pain, no palpitations  GASTROINTESTINAL: No pain. No nausea or vomiting; No diarrhea   NEUROLOGICAL: No headache or numbness, no tremors  MUSCULOSKELETAL: No joint pain, no muscle pain  GENITOURINARY: no dysuria, no frequency, no hesitancy  PSYCHIATRY: no depression , no anxiety  ALL OTHER  ROS negative        Vital Signs Last 24 Hrs  T(C): 36.6 (24 May 2018 05:51), Max: 37.2 (23 May 2018 16:45)  T(F): 97.9 (24 May 2018 05:51), Max: 98.9 (23 May 2018 16:45)  HR: 88 (24 May 2018 05:51) (74 - 88)  BP: 143/76 (24 May 2018 05:51) (132/66 - 143/76)  BP(mean): --  RR: 16 (24 May 2018 05:51) (16 - 17)  SpO2: 100% (24 May 2018 05:51) (96% - 100%)    ________________________________________________  PHYSICAL EXAM:  GENERAL: NAD  HEENT: Normocephalic;  conjunctivae and sclerae clear; moist mucous membranes;   NECK : supple  CHEST/LUNG: Clear to auscultation bilaterally with good air entry   HEART: S1 S2  regular; no murmurs, gallops or rubs  ABDOMEN: Soft, midly tender throughout, Nondistended; Bowel sounds present  EXTREMITIES: no cyanosis; no edema; no calf tenderness  SKIN: warm and dry; no rash  NERVOUS SYSTEM:  Awake and alert; Oriented  to place, person and time ; no new deficits    _________________________________________________  LABS:                        9.3    4.9   )-----------( 252      ( 24 May 2018 06:58 )             30.3     05-24    142  |  109<H>  |  8   ----------------------------<  100<H>  3.4<L>   |  27  |  0.99    Ca    9.4      24 May 2018 06:58  Phos  3.0     05-23  Mg     1.8     05-23          CAPILLARY BLOOD GLUCOSE    < from: CT Abdomen and Pelvis w/ IV Cont (05.22.18 @ 12:24) >  EXAM:  CT ABDOMEN AND PELVIS IC                            PROCEDURE DATE:  05/22/2018      < end of copied text >  < from: CT Abdomen and Pelvis w/ IV Cont (05.22.18 @ 12:24) >  IMPRESSION:  Colitis involving the descending colon . No bowel obstruction.    < end of copied text >          RADIOLOGY & ADDITIONAL TESTS:    Imaging  Reviewed:  YES  Consultant(s) Notes Reviewed:   YES      Plan of care was discussed with patient ; all questions and concerns were addressed

## 2018-05-25 VITALS
TEMPERATURE: 98 F | SYSTOLIC BLOOD PRESSURE: 146 MMHG | HEART RATE: 81 BPM | OXYGEN SATURATION: 99 % | RESPIRATION RATE: 18 BRPM | DIASTOLIC BLOOD PRESSURE: 60 MMHG

## 2018-05-25 LAB
ANION GAP SERPL CALC-SCNC: 4 MMOL/L — LOW (ref 5–17)
BUN SERPL-MCNC: 11 MG/DL — SIGNIFICANT CHANGE UP (ref 7–18)
CALCIUM SERPL-MCNC: 9.1 MG/DL — SIGNIFICANT CHANGE UP (ref 8.4–10.5)
CHLORIDE SERPL-SCNC: 109 MMOL/L — HIGH (ref 96–108)
CO2 SERPL-SCNC: 28 MMOL/L — SIGNIFICANT CHANGE UP (ref 22–31)
CREAT SERPL-MCNC: 1.06 MG/DL — SIGNIFICANT CHANGE UP (ref 0.5–1.3)
GLUCOSE SERPL-MCNC: 107 MG/DL — HIGH (ref 70–99)
HCT VFR BLD CALC: 30.8 % — LOW (ref 34.5–45)
HGB BLD-MCNC: 9.1 G/DL — LOW (ref 11.5–15.5)
MCHC RBC-ENTMCNC: 24.2 PG — LOW (ref 27–34)
MCHC RBC-ENTMCNC: 29.6 GM/DL — LOW (ref 32–36)
MCV RBC AUTO: 81.9 FL — SIGNIFICANT CHANGE UP (ref 80–100)
PLATELET # BLD AUTO: 238 K/UL — SIGNIFICANT CHANGE UP (ref 150–400)
POTASSIUM SERPL-MCNC: 3.6 MMOL/L — SIGNIFICANT CHANGE UP (ref 3.5–5.3)
POTASSIUM SERPL-SCNC: 3.6 MMOL/L — SIGNIFICANT CHANGE UP (ref 3.5–5.3)
RBC # BLD: 3.77 M/UL — LOW (ref 3.8–5.2)
RBC # FLD: 15.5 % — HIGH (ref 10.3–14.5)
SODIUM SERPL-SCNC: 141 MMOL/L — SIGNIFICANT CHANGE UP (ref 135–145)
WBC # BLD: 4.8 K/UL — SIGNIFICANT CHANGE UP (ref 3.8–10.5)
WBC # FLD AUTO: 4.8 K/UL — SIGNIFICANT CHANGE UP (ref 3.8–10.5)

## 2018-05-25 PROCEDURE — 84100 ASSAY OF PHOSPHORUS: CPT

## 2018-05-25 PROCEDURE — 82272 OCCULT BLD FECES 1-3 TESTS: CPT

## 2018-05-25 PROCEDURE — 86901 BLOOD TYPING SEROLOGIC RH(D): CPT

## 2018-05-25 PROCEDURE — 81003 URINALYSIS AUTO W/O SCOPE: CPT

## 2018-05-25 PROCEDURE — 80061 LIPID PANEL: CPT

## 2018-05-25 PROCEDURE — 84443 ASSAY THYROID STIM HORMONE: CPT

## 2018-05-25 PROCEDURE — 83036 HEMOGLOBIN GLYCOSYLATED A1C: CPT

## 2018-05-25 PROCEDURE — 83690 ASSAY OF LIPASE: CPT

## 2018-05-25 PROCEDURE — 99285 EMERGENCY DEPT VISIT HI MDM: CPT | Mod: 25

## 2018-05-25 PROCEDURE — 83550 IRON BINDING TEST: CPT

## 2018-05-25 PROCEDURE — 86900 BLOOD TYPING SEROLOGIC ABO: CPT

## 2018-05-25 PROCEDURE — 85610 PROTHROMBIN TIME: CPT

## 2018-05-25 PROCEDURE — 85730 THROMBOPLASTIN TIME PARTIAL: CPT

## 2018-05-25 PROCEDURE — 74177 CT ABD & PELVIS W/CONTRAST: CPT

## 2018-05-25 PROCEDURE — 80053 COMPREHEN METABOLIC PANEL: CPT

## 2018-05-25 PROCEDURE — 85027 COMPLETE CBC AUTOMATED: CPT

## 2018-05-25 PROCEDURE — 82728 ASSAY OF FERRITIN: CPT

## 2018-05-25 PROCEDURE — 82607 VITAMIN B-12: CPT

## 2018-05-25 PROCEDURE — 83615 LACTATE (LD) (LDH) ENZYME: CPT

## 2018-05-25 PROCEDURE — 82746 ASSAY OF FOLIC ACID SERUM: CPT

## 2018-05-25 PROCEDURE — 86850 RBC ANTIBODY SCREEN: CPT

## 2018-05-25 PROCEDURE — 80048 BASIC METABOLIC PNL TOTAL CA: CPT

## 2018-05-25 PROCEDURE — 85045 AUTOMATED RETICULOCYTE COUNT: CPT

## 2018-05-25 PROCEDURE — 83735 ASSAY OF MAGNESIUM: CPT

## 2018-05-25 RX ORDER — OMEPRAZOLE 10 MG/1
1 CAPSULE, DELAYED RELEASE ORAL
Qty: 0 | Refills: 0 | COMMUNITY
Start: 2018-05-25

## 2018-05-25 RX ORDER — SENNA PLUS 8.6 MG/1
2 TABLET ORAL
Qty: 60 | Refills: 0 | OUTPATIENT
Start: 2018-05-25 | End: 2018-06-23

## 2018-05-25 RX ORDER — CARVEDILOL PHOSPHATE 80 MG/1
0 CAPSULE, EXTENDED RELEASE ORAL
Qty: 60 | Refills: 0 | COMMUNITY

## 2018-05-25 RX ORDER — METRONIDAZOLE 500 MG
100 TABLET ORAL
Qty: 21 | Refills: 0 | OUTPATIENT
Start: 2018-05-25 | End: 2018-05-31

## 2018-05-25 RX ORDER — OMEPRAZOLE 10 MG/1
0 CAPSULE, DELAYED RELEASE ORAL
Qty: 30 | Refills: 0 | COMMUNITY

## 2018-05-25 RX ORDER — CARVEDILOL PHOSPHATE 80 MG/1
12.5 CAPSULE, EXTENDED RELEASE ORAL
Qty: 60 | Refills: 0 | COMMUNITY

## 2018-05-25 RX ORDER — CIPROFLOXACIN LACTATE 400MG/40ML
40 VIAL (ML) INTRAVENOUS
Qty: 14 | Refills: 0 | OUTPATIENT
Start: 2018-05-25 | End: 2018-05-31

## 2018-05-25 RX ORDER — MINERAL OIL
133 OIL (ML) MISCELLANEOUS ONCE
Qty: 0 | Refills: 0 | Status: COMPLETED | OUTPATIENT
Start: 2018-05-25 | End: 2018-05-25

## 2018-05-25 RX ORDER — LACTULOSE 10 G/15ML
15 SOLUTION ORAL ONCE
Qty: 0 | Refills: 0 | Status: COMPLETED | OUTPATIENT
Start: 2018-05-25 | End: 2018-05-25

## 2018-05-25 RX ORDER — PANTOPRAZOLE SODIUM 20 MG/1
1 TABLET, DELAYED RELEASE ORAL
Qty: 0 | Refills: 0 | COMMUNITY
Start: 2018-05-25

## 2018-05-25 RX ORDER — DOCUSATE SODIUM 100 MG
1 CAPSULE ORAL
Qty: 90 | Refills: 0 | OUTPATIENT
Start: 2018-05-25 | End: 2018-06-23

## 2018-05-25 RX ADMIN — Medication 100 MILLIGRAM(S): at 05:33

## 2018-05-25 RX ADMIN — Medication 100 MILLIGRAM(S): at 15:25

## 2018-05-25 RX ADMIN — PANTOPRAZOLE SODIUM 40 MILLIGRAM(S): 20 TABLET, DELAYED RELEASE ORAL at 05:33

## 2018-05-25 RX ADMIN — Medication 145 MILLIGRAM(S): at 12:26

## 2018-05-25 RX ADMIN — Medication 200 MILLIGRAM(S): at 05:33

## 2018-05-25 NOTE — PROGRESS NOTE ADULT - NEGATIVE ENMT SYMPTOMS
no gum bleeding/no throat pain/no dysphagia/no nose bleeds/no dry mouth/no hearing difficulty
no hearing difficulty/no gum bleeding/no dry mouth/no nose bleeds/no throat pain/no dysphagia

## 2018-05-25 NOTE — DISCHARGE NOTE ADULT - ADDITIONAL INSTRUCTIONS
Your blood pressure is low.  Therefore your Hydrochlorothiazide was stopped and your Carvedilol was cut in half.  Please follow up w/ PCP in 2-3 days to discuss increasing your blood pressure medication.  Recommend outpatient Colonoscopy in 6-8 weeks

## 2018-05-25 NOTE — DISCHARGE NOTE ADULT - MEDICATION SUMMARY - MEDICATIONS TO CHANGE
I will SWITCH the dose or number of times a day I take the medications listed below when I get home from the hospital:    CARVEDILOL   TAB 25MG

## 2018-05-25 NOTE — DISCHARGE NOTE ADULT - HOSPITAL COURSE
78 year old female from home walk with walker with PMH of HTN, HLD, vertigo and PSH of bilateral knee replacement on NSAID.  Presented with BRBPR started yesterday.     Lower GI Bleed  Baseline Hgb approx ~12.  On admission Hgb=9.7  Stool occult positive  Gastroenterologist-Dr Coates consulted.  Recommend outpatient Colonoscopy in 6-8 weeks    Colitis  CT abdomen revealed mild colitis  Treated w/ Cipro and Flagyl and IVF  Diet advanced as tolerated  Gastroenterologist-Dr. Coates consulted.  Recommend outpatient Colonoscopy in 6-8 weeks     HTN (Hypertension)   Held Hydrochlorothiazide, Losartan and Carvedilol due to low blood pressure  Monitor BP and on the low side.  Continued Procardia.    Need for Prophylactic Measure  Chemical VTE prophylaxis held due to GI bleed

## 2018-05-25 NOTE — PROGRESS NOTE ADULT - SUBJECTIVE AND OBJECTIVE BOX
Patient is a 78y old  Female who presents with a chief complaint of BRBPR (22 May 2018 14:21)    pt seen in icu [  ], reg med floor [ x  ], bed [ x ], chair at bedside [   ], a+o x3 [ x ], lethargic [  ],  nad [ x ]      Allergies    No Known Allergies        Vitals    T(F): 98.4 (05-25-18 @ 05:53), Max: 98.4 (05-25-18 @ 05:53)  HR: 71 (05-25-18 @ 05:53) (71 - 79)  BP: 106/52 (05-25-18 @ 05:53) (106/52 - 146/58)  RR: 16 (05-25-18 @ 05:53) (16 - 18)  SpO2: 100% (05-25-18 @ 05:53) (99% - 100%)  Wt(kg): --  CAPILLARY BLOOD GLUCOSE          Labs                          9.3    4.9   )-----------( 252      ( 24 May 2018 06:58 )             30.3       05-24    142  |  109<H>  |  8   ----------------------------<  100<H>  3.4<L>   |  27  |  0.99    Ca    9.4      24 May 2018 06:58                  Radiology Results      Meds    MEDICATIONS  (STANDING):  carvedilol 25 milliGRAM(s) Oral every 12 hours  ciprofloxacin   IVPB 400 milliGRAM(s) IV Intermittent every 12 hours  docusate sodium 100 milliGRAM(s) Oral three times a day  donepezil 10 milliGRAM(s) Oral at bedtime  fenofibrate Tablet 145 milliGRAM(s) Oral daily  metroNIDAZOLE  IVPB 500 milliGRAM(s) IV Intermittent every 8 hours  NIFEdipine XL 30 milliGRAM(s) Oral daily  pantoprazole    Tablet 40 milliGRAM(s) Oral before breakfast  potassium chloride    Tablet ER 40 milliEquivalent(s) Oral once  senna 2 Tablet(s) Oral at bedtime  simvastatin 40 milliGRAM(s) Oral at bedtime  sodium chloride 0.9%. 1000 milliLiter(s) (80 mL/Hr) IV Continuous <Continuous>      MEDICATIONS  (PRN):      Physical Exam    Neuro :  no focal deficits  Respiratory: CTA B/L  CV: RRR, S1S2, no murmurs,   Abdominal: Soft, ND +BS, nt  Extremities: No edema, + peripheral pulses    ASSESSMENT    brbpr  colitis  h/o HLD (hyperlipidemia)  HTN (hypertension)  Vertigo  No significant past surgical history      PLAN    cont cipro and flagyl to complete 10 days  gi f/u  pt tolerating clears   adv to full diet  colonoscopy in 6-8 weeks   pt much improved  cont current meds  d/c plan Patient is a 78y old  Female who presents with a chief complaint of BRBPR (22 May 2018 14:21)    pt seen in icu [  ], reg med floor [ x  ], bed [ x ], chair at bedside [   ], a+o x3 [ x ], lethargic [  ],  nad [ x ]    c/o constipation for past 4 days    Allergies    No Known Allergies        Vitals    T(F): 98.4 (05-25-18 @ 05:53), Max: 98.4 (05-25-18 @ 05:53)  HR: 71 (05-25-18 @ 05:53) (71 - 79)  BP: 106/52 (05-25-18 @ 05:53) (106/52 - 146/58)  RR: 16 (05-25-18 @ 05:53) (16 - 18)  SpO2: 100% (05-25-18 @ 05:53) (99% - 100%)  Wt(kg): --  CAPILLARY BLOOD GLUCOSE          Labs                          9.3    4.9   )-----------( 252      ( 24 May 2018 06:58 )             30.3       05-24    142  |  109<H>  |  8   ----------------------------<  100<H>  3.4<L>   |  27  |  0.99    Ca    9.4      24 May 2018 06:58                  Radiology Results      Meds    MEDICATIONS  (STANDING):  carvedilol 25 milliGRAM(s) Oral every 12 hours  ciprofloxacin   IVPB 400 milliGRAM(s) IV Intermittent every 12 hours  docusate sodium 100 milliGRAM(s) Oral three times a day  donepezil 10 milliGRAM(s) Oral at bedtime  fenofibrate Tablet 145 milliGRAM(s) Oral daily  metroNIDAZOLE  IVPB 500 milliGRAM(s) IV Intermittent every 8 hours  NIFEdipine XL 30 milliGRAM(s) Oral daily  pantoprazole    Tablet 40 milliGRAM(s) Oral before breakfast  potassium chloride    Tablet ER 40 milliEquivalent(s) Oral once  senna 2 Tablet(s) Oral at bedtime  simvastatin 40 milliGRAM(s) Oral at bedtime  sodium chloride 0.9%. 1000 milliLiter(s) (80 mL/Hr) IV Continuous <Continuous>      MEDICATIONS  (PRN):      Physical Exam    Neuro :  no focal deficits  Respiratory: CTA B/L  CV: RRR, S1S2, no murmurs,   Abdominal: Soft, ND +BS, nt  Extremities: No edema, + peripheral pulses    ASSESSMENT    brbpr  colitis  constipation  h/o HLD (hyperlipidemia)  HTN (hypertension)  Vertigo  No significant past surgical history      PLAN    cont cipro and flagyl to complete 10 days  gi f/u  pt tolerating full diet  colonoscopy in 6-8 weeks   pt much improved  give lactulose 15 g and oil enema  add coloace 100mg bid  add senna at bedtime  cont current meds  d/c plan after bm

## 2018-05-25 NOTE — PROGRESS NOTE ADULT - SUBJECTIVE AND OBJECTIVE BOX
[   ] ICU                                          [   ] CCU                                      [ X  ] Medical Floor    Patient is comfortable. No new complaints reported, No abdominal pain, N/V, hematemesis, hematochezia, melena, fever, chills, chest pain, SOB, cough or diarrhea reported.    VITALS  Vital Signs Last 24 Hrs  T(C): 36.9 (25 May 2018 14:40), Max: 36.9 (25 May 2018 05:53)  T(F): 98.5 (25 May 2018 14:40), Max: 98.5 (25 May 2018 14:40)  HR: 81 (25 May 2018 14:40) (71 - 81)  BP: 146/60 (25 May 2018 14:40) (106/52 - 146/60)  BP(mean): 81 (25 May 2018 14:40) (81 - 81)  RR: 18 (25 May 2018 14:40) (16 - 18)  SpO2: 99% (25 May 2018 14:40) (99% - 100%)       MEDICATIONS  (STANDING):  carvedilol 25 milliGRAM(s) Oral every 12 hours  ciprofloxacin   IVPB 400 milliGRAM(s) IV Intermittent every 12 hours  docusate sodium 100 milliGRAM(s) Oral three times a day  donepezil 10 milliGRAM(s) Oral at bedtime  fenofibrate Tablet 145 milliGRAM(s) Oral daily  metroNIDAZOLE  IVPB 500 milliGRAM(s) IV Intermittent every 8 hours  NIFEdipine XL 30 milliGRAM(s) Oral daily  pantoprazole    Tablet 40 milliGRAM(s) Oral before breakfast  potassium chloride    Tablet ER 40 milliEquivalent(s) Oral once  senna 2 Tablet(s) Oral at bedtime  simvastatin 40 milliGRAM(s) Oral at bedtime  sodium chloride 0.9%. 1000 milliLiter(s) (80 mL/Hr) IV Continuous <Continuous>    MEDICATIONS  (PRN):                            9.1    4.8   )-----------( 238      ( 25 May 2018 07:13 )             30.8       05-25    141  |  109<H>  |  11  ----------------------------<  107<H>  3.6   |  28  |  1.06    Ca    9.1      25 May 2018 07:13

## 2018-05-25 NOTE — PROGRESS NOTE ADULT - ASSESSMENT
78 year old female from home walk with walker with history of HTN, HLD, vertigo and PSH of bilateral knee replacement on NSAID presented with BRBPR found with colitis. h/h stable on iv abx.
1. Abdominal pain  2. Colitis  3. Anemia    Suggestions:    1. Monitor H/H and electrolytes  2. Protonix daily  3. Avoid NSAID  4. DVT prophylaxis  5. Continue antibiotics  6. Colonoscopy out patient in 6-8 weeks  7. Advance diet as tolerated
1. Rectal bleeding (stopped)  2. Colitis  3. No evidence of acute GI bleeding    Suggestions:    1. Monitor H/H  2. Monitor electrolytes  3. Protonix daily  4. Continue antibiotics  5. Colonoscopy out patient in 6-8 weeks  6. Avoid NSAID  7. DVT prophylaxis

## 2018-05-25 NOTE — DISCHARGE NOTE ADULT - MEDICATION SUMMARY - MEDICATIONS TO STOP TAKING
I will STOP taking the medications listed below when I get home from the hospital:    HYDROCHLOROT TAB 12.5MG

## 2018-05-25 NOTE — PROGRESS NOTE ADULT - NEGATIVE GASTROINTESTINAL SYMPTOMS
no nausea/no diarrhea/no vomiting/no hematochezia/no abdominal pain/no melena/no jaundice
no nausea/no hematochezia/no jaundice/no melena/no vomiting

## 2018-05-25 NOTE — DISCHARGE NOTE ADULT - PATIENT PORTAL LINK FT
You can access [email or web address], offered by [facility], by registering with the following website: [email or web address]

## 2018-05-25 NOTE — DISCHARGE NOTE ADULT - CARE PLAN
Principal Discharge DX:	Colitis  Goal:	Completion of antibiotics  Assessment and plan of treatment:	Continue and complete Ciprofloxacin and Flagyl  as instructed  Follow up w/ PCP in 1 week  Secondary Diagnosis:	Rectal bleeding  Goal:	Resolved  Assessment and plan of treatment:	Your rectal bleeding was due to the Colitis.  Therefore, continue and complete your antibiotics for the Colitis.  Secondary Diagnosis:	HTN (hypertension)  Goal:	Maintain normal blood pressure  Assessment and plan of treatment:	Your blood pressure is low.  Therefore, your Carvedilol was cut in half, please only take half a tablet daily. Your Hydrochlorothiazide was stopped due to low blood pressure.  Please follow up w/ PCP in 2-3 days to discuss resuming a higher does of Carvedilol.  Secondary Diagnosis:	Vertigo

## 2018-05-25 NOTE — PROGRESS NOTE ADULT - PROVIDER SPECIALTY LIST ADULT
Gastroenterology
Internal Medicine
Gastroenterology

## 2018-05-25 NOTE — PROGRESS NOTE ADULT - RS GEN PE MLT RESP DETAILS PC
good air movement/airway patent/breath sounds equal
good air movement/airway patent/breath sounds equal

## 2018-05-25 NOTE — PROGRESS NOTE ADULT - GASTROINTESTINAL DETAILS
soft/nontender/no distention
no rebound tenderness/no rigidity/soft/nontender/no distention/bowel sounds normal/no guarding

## 2018-05-25 NOTE — DISCHARGE NOTE ADULT - MEDICATION SUMMARY - MEDICATIONS TO TAKE
I will START or STAY ON the medications listed below when I get home from the hospital:    metroNIDAZOLE 500 mg/100 mL intravenous solution  -- 100 milliliter(s) intravenous every 8 hours x 7d  -- Indication: For Colitis    NABUMETONE   TAB 750MG  -- Indication: For Pain    FENOFIBRATE  CAP 134MG  -- Indication: For Need for prophylactic measure    SIMVASTATIN  TAB 40MG  -- Indication: For No significant past surgical history    CARVEDILOL   TAB 25MG  -- 12.5 milligram(s) by mouth once a day  -- Indication: For HTN (hypertension)    NIFEDICAL XL TAB 60MG  -- Indication: For HTN (hypertension)    DONEPEZIL    TAB 10MG  -- Indication: For Need for prophylactic measure    docusate sodium 100 mg oral capsule  -- 1 cap(s) by mouth 3 times a day  -- Indication: For Constipation    senna oral tablet  -- 2 tab(s) by mouth once a day (at bedtime)  -- Indication: For Constipation    omeprazole 40 mg oral delayed release capsule  -- 1 cap(s) by mouth once a day  -- Indication: For GERD    ciprofloxacin 10 mg/mL intravenous solution  -- 40 milliliter(s) intravenous every 12 hours x 7d  -- Indication: For Colitis

## 2018-05-25 NOTE — DISCHARGE NOTE ADULT - CARE PROVIDER_API CALL
Darci Parra), Internal Medicine  [address] [place] [address]  Phone: [phone]  Fax: [phone]    Ector Coates), Medicine  [address] [place] [address]  Phone: [phone]  Fax: [phone]

## 2018-05-25 NOTE — DISCHARGE NOTE ADULT - PLAN OF CARE
Completion of antibiotics Continue and complete Ciprofloxacin and Flagyl  as instructed  Follow up w/ PCP in 1 week Resolved Your rectal bleeding was due to the Colitis.  Therefore, continue and complete your antibiotics for the Colitis. Maintain normal blood pressure Your blood pressure is low.  Therefore, your Carvedilol was cut in half, please only take half a tablet daily. Your Hydrochlorothiazide was stopped due to low blood pressure.  Please follow up w/ PCP in 2-3 days to discuss resuming a higher does of Carvedilol.

## 2018-05-26 ENCOUNTER — EMERGENCY (EMERGENCY)
Facility: HOSPITAL | Age: 78
LOS: 1 days | Discharge: HOME | End: 2018-05-26
Attending: EMERGENCY MEDICINE
Payer: MEDICARE

## 2018-05-26 VITALS
RESPIRATION RATE: 16 BRPM | HEART RATE: 85 BPM | DIASTOLIC BLOOD PRESSURE: 81 MMHG | TEMPERATURE: 98 F | SYSTOLIC BLOOD PRESSURE: 147 MMHG | OXYGEN SATURATION: 99 %

## 2018-05-26 VITALS — WEIGHT: 190.04 LBS | HEIGHT: 60 IN

## 2018-05-26 PROCEDURE — 99283 EMERGENCY DEPT VISIT LOW MDM: CPT

## 2018-05-26 PROCEDURE — 99281 EMR DPT VST MAYX REQ PHY/QHP: CPT

## 2018-05-26 RX ORDER — METRONIDAZOLE 500 MG
1 TABLET ORAL
Qty: 21 | Refills: 0 | OUTPATIENT
Start: 2018-05-26 | End: 2018-06-01

## 2018-05-26 RX ORDER — METRONIDAZOLE 500 MG
500 TABLET ORAL ONCE
Qty: 0 | Refills: 0 | Status: COMPLETED | OUTPATIENT
Start: 2018-05-26 | End: 2018-05-26

## 2018-05-26 RX ORDER — CIPROFLOXACIN LACTATE 400MG/40ML
1 VIAL (ML) INTRAVENOUS
Qty: 14 | Refills: 0 | OUTPATIENT
Start: 2018-05-26 | End: 2018-06-01

## 2018-05-26 RX ORDER — CIPROFLOXACIN LACTATE 400MG/40ML
500 VIAL (ML) INTRAVENOUS ONCE
Qty: 0 | Refills: 0 | Status: COMPLETED | OUTPATIENT
Start: 2018-05-26 | End: 2018-05-26

## 2018-05-26 RX ADMIN — Medication 500 MILLIGRAM(S): at 18:55

## 2018-05-26 NOTE — ED PROVIDER NOTE - MEDICAL DECISION MAKING DETAILS
78F p/w medication prescription error. Rx for correct formulation sent to pharmacy. d/c home. Give 1 dose cipro and flagyl now. patient will pickup tomorrow morning.

## 2018-05-26 NOTE — ED PROVIDER NOTE - OBJECTIVE STATEMENT
78F p/w medication issue. Patient was seen here this past week for abd pain. found to have colitis. Patient sent home with 7days of cipro and flagyl. Patient presents because inability to get the abx. Patient showed prescriptions. Rx written for IV formulation which patient cannot obtain as outpt. Patient endorses no symptoms.

## 2018-10-01 ENCOUNTER — OUTPATIENT (OUTPATIENT)
Dept: OUTPATIENT SERVICES | Facility: HOSPITAL | Age: 78
LOS: 1 days | End: 2018-10-01
Payer: MEDICARE

## 2018-10-01 PROCEDURE — G9001: CPT

## 2018-10-11 DIAGNOSIS — Z71.89 OTHER SPECIFIED COUNSELING: ICD-10-CM

## 2018-10-11 PROBLEM — I10 ESSENTIAL (PRIMARY) HYPERTENSION: Chronic | Status: ACTIVE | Noted: 2018-05-22

## 2018-10-11 PROBLEM — E78.5 HYPERLIPIDEMIA, UNSPECIFIED: Chronic | Status: ACTIVE | Noted: 2018-05-22

## 2018-10-30 NOTE — ED PROVIDER NOTE - NSTIMEPROVIDERCAREINITIATE_GEN_ER
22-May-2018 09:07 Consent (Scalp)/Introductory Paragraph: The rationale for Mohs was explained to the patient and consent was obtained. The risks, benefits and alternatives to therapy were discussed in detail. Specifically, the risks of changes in hair growth pattern secondary to repair, infection, scarring, bleeding, prolonged wound healing, incomplete removal, allergy to anesthesia, nerve injury and recurrence were addressed. Prior to the procedure, the treatment site was clearly identified and confirmed by the patient using a hand mirror. All components of Universal Protocol/PAUSE Rule completed.

## 2019-11-16 NOTE — DISCHARGE NOTE ADULT - NS AS DC AMI YN
Patient : Moris Wetzel Age: 89 year old Sex: female   MRN: [id number] Encounter Date: 11/16/2019  E12/12    History     Chief Complaint   Patient presents with   • Shortness of Breath     HPI  11/16/2019  5:43 PM Moris Wetzel is a 89 year old female with PMHx significant for COPD, emphysema, CHF, and a PE associated with hip replacement surgery, who presents to the ED, accompanied by a friend, for evaluation of SOB that has been worsening over the past few days. SOB is worsened with walking. Associated sx include chronic leg swelling worse at night. The pt denies fevers, cough, CP, pain with inspiration, N/V/D, abd pain, or bloody stools. Pt wears 3L O2 nc every day at home. She had similar sx a year ago and she was dx with COPD flare-up. She was given Advair at this time. There are no further complaints or modifying factors at this time.    PCP: Dago Bryan MD    Allergies   Allergen Reactions   • Percocet [Oxycodone-Acetaminophen] NAUSEA   • Aldomet [Methyldopa] NAUSEA     weakness   • Guanfacine DIZZINESS and NAUSEA     weakness   • Nexium DIARRHEA   • Prevacid DIARRHEA   • Amlodipine Other (See Comments)     Foot swelling   • Ranitidine DIARRHEA   • Spiriva Handihaler Other (See Comments)     Dry mouth, upset stomach   • Sulfa Antibiotics Other (See Comments)     Renal Failure       Current Discharge Medication List      Prior to Admission Medications    Details   rabeprazole (ACIPHEX) 20 MG tablet Take 1 tablet by mouth daily. Indications: Indigestion  Qty: 30 tablet, Refills: 2      furosemide (LASIX) 20 MG tablet TAKE 1 TABLET BY MOUTH ONCE DAILY FOR EDEMA  Qty: 90 tablet, Refills: 1      ferrous sulfate 325 (65 FE) MG tablet Take 1 tablet by mouth daily (with breakfast). Indications: Anemia From Inadequate Iron in the Body  Qty: 100 tablet, Refills: 1      metoPROLOL tartrate (LOPRESSOR) 25 MG tablet Take 2 tablets by mouth in the morning and take 1 tablet in the evening for high blood  pressure.  Qty: 270 tablet, Refills: 3      acetaminophen (TYLENOL) 500 MG tablet Take 2 tablets by mouth 2 times daily as needed for Pain. 2 tablets (=1,000mg)  Qty: 100 tablet, Refills: 6      calcium carbonate (TUMS) 500 MG chewable tablet Chew 1 tablet by mouth 2 times daily. Indications: Acid Indigestion  Qty: 100 tablet, Refills: 3      etanercept (ENBREL) 50 MG/ML injection Inject twice weekly on Tuesdays and Fridays.       atorvastatin (LIPITOR) 20 MG tablet Take 1 tablet by mouth nightly. For cholesterol  Qty: 90 tablet, Refills: 2      levalbuterol (XOPENEX HFA) 45 MCG/ACT inhaler Inhale 1-2 puffs into the lungs every 4 hours as needed for Wheezing or Shortness of Breath.  Qty: 15 g, Refills: 10      Emollient (CERAVE) Cream Apply topically 2 times daily.      flurazepam (DALMANE) 15 MG capsule TAKE 1 CAPSULE BY MOUTH AT BEDTIME AS NEEDED FOR SLEEP  Qty: 30 capsule, Refills: 0      oxymetazoline (AFRIN) 0.05 % nasal spray 2 sprays by Right Nare route 2 times daily.  Qty: 30 mL, Refills: 1      ADVAIR DISKUS 250-50 MCG/DOSE inhaler INHALE 1 PUFF TWICE A DAY FOR BREATHING **RINSE MOUTH AFTER USE**  Qty: 120 each, Refills: 11      hydrALAZINE (APRESOLINE) 25 MG tablet TAKE 1 TABLET BY MOUTH THREE TIMES DAILY FOR HIGH BLOOD PRESSURE  Qty: 90 tablet, Refills: 11      latanoprost (XALATAN) 0.005 % ophthalmic solution INSTILL 1 DROP INTO LEFT EYE ONCE DAILY IN THE EVENING  Qty: 2.5 mL, Refills: 11      diphenoxylate-atropine (LOMOTIL) 2.5-0.025 MG tablet Take 1 tablet by mouth 4 times daily as needed for Diarrhea.  Qty: 120 tablet, Refills: 1      cholecalciferol (VITAMIN D3) 1000 UNITS tablet Take 1 tablet by mouth daily.  Qty: 30 tablet, Refills: 5      Multiple Vitamins-Minerals (CENTRUM SILVER) tablet Take 1 tablet by mouth daily.  Qty: 30 tablet, Refills: 5             Current Discharge Medication List          Past Medical History:   Diagnosis Date   • Anemia    • Arthritis     back, hands, difficult to lie  flat   • Bleeding from the nose 03/25/2019   • Blood clot associated with vein wall inflammation    • Congestive cardiac failure (CMS/HCC)    • COPD (chronic obstructive pulmonary disease) (CMS/HCC)    • Coronary artery disease    • Disorder of bone and cartilage, unspecified 06/06/2009   • Diverticulosis    • DVT (deep venous thrombosis) (CMS/HCC)    • Elevated lipids    • Esophageal reflux    • Fracture    • Hearing difficulty    • High cholesterol    • Hypertension    • Murmur    • Osteoporosis    • Other chronic pain    • Ovarian cyst     Simple- normal  -  2008   • PE (pulmonary embolism)    • Personal history of traumatic fracture    • Presence of IVC filter    • Urinary tract infection        Past Surgical History:   Procedure Laterality Date   • BREAST SURGERY      nodule removed   • CARDIAC CATHERIZATION      angioplasty   • COLONOSCOPY  6/9/2005   • DEXA BONE DENSITY AXIAL SKELETON  09/02/2003   • ECHOCARDIOGRAM  08/26/2013   • ECHOCARDIOGRAM  06/26/2014   • ECHOCARDIOGRAM  08/05/2016   • ESOPHAGOGASTRODUODENOSCOPY TRANSORAL FLEX W/BX SINGLE OR MULT  6/2/2011    dysphagia, gastritis, duodenitis. hiatus hernia   • FOREARM/WRIST SURGERY UNLISTED  7/2000    Unspecified Forearm/Wrist procedure   • INFERIOR VENA CAVA FILTER  2/1997   • JOINT REPLACEMENT     • OCCULT BLOOD TEST TUBE  05/26/2011   • PELVIS LAPAROSCOPY,DX  7/23/12    Dx Lap w/ RSO   • REMOVAL GALLBLADDER  7/2010    Cholecystectomy (gallbladder)   • REMV 2ND CATARACT,CORN-SCLER SECTN  2008    Cataract removal   • TONSILLECTOMY     • TOTAL HIP REPLACEMENT  1996,1997,2001    Hip replacement,1x left 2xs right       Family History   Problem Relation Age of Onset   • Stroke Mother    • Heart disease Father        Social History     Tobacco Use   • Smoking status: Passive Smoke Exposure - Never Smoker   • Smokeless tobacco: Never Used   Substance Use Topics   • Alcohol use: No     Frequency: Never     Drinks per session: 1 or 2     Binge frequency:  Never   • Drug use: No       Review of Systems   Constitutional: Negative for chills and fever.   HENT: Negative for congestion.    Eyes: Negative for photophobia and visual disturbance.   Respiratory: Negative for cough, chest tightness and shortness of breath.         Negative for pain with inspiration   Cardiovascular: Positive for leg swelling (chronic). Negative for chest pain.   Gastrointestinal: Negative for abdominal pain, blood in stool, diarrhea, nausea and vomiting.   Genitourinary: Negative for decreased urine volume, difficulty urinating and dysuria.   Musculoskeletal: Negative for arthralgias and myalgias.   Skin: Negative for rash.   Neurological: Negative for dizziness, weakness and headaches.   Hematological: Does not bruise/bleed easily.       Physical Exam     ED Triage Vitals [11/16/19 1740]   ED Triage Vitals Group      Temp 97.7 °F (36.5 °C)      Pulse 89      Resp 20      /74      SpO2 (S) (!) 88 %      EtCO2 mmHg       Height 5' 1\" (1.549 m)      Weight 116 lb (52.6 kg)      Weight Scale Used ED Estimate      BMI (Calculated) 21.92      IBW/kg (Calculated) 47.8       Physical Exam   Constitutional: She is oriented to person, place, and time. She appears well-developed. No distress.   HENT:   Head: Normocephalic.   Mouth/Throat: Oropharynx is clear and moist.   Eyes: Pupils are equal, round, and reactive to light. Conjunctivae and EOM are normal.   Neck: Normal range of motion. Neck supple.   Cardiovascular: Normal rate, regular rhythm and normal heart sounds.   Pulses:       Radial pulses are 2+ on the right side and 2+ on the left side.   Pulmonary/Chest: Effort normal and breath sounds normal. No respiratory distress. She has no wheezes.   Speaking in full sentences  Lungs CTA   Abdominal: Soft. Bowel sounds are normal. She exhibits no pulsatile midline mass and no mass. There is no tenderness. There is no guarding. Musculoskeletal: Normal range of motion.         General: No edema  (BLE).     Lymphadenopathy:     She has no cervical adenopathy.   Neurological: She is alert and oriented to person, place, and time. She has normal strength. No cranial nerve deficit or sensory deficit. GCS eye subscore is 4. GCS verbal subscore is 5. GCS motor subscore is 6.   Skin: Skin is warm and dry. No rash noted.   Psychiatric: She has a normal mood and affect. Her speech is normal and behavior is normal. Thought content normal.   Nursing note and vitals reviewed.      ED Course     Procedures    Lab Results     Results for orders placed or performed during the hospital encounter of 11/16/19   CBC with Automated Differential   Result Value Ref Range    WBC 5.7 4.2 - 11.0 K/mcL    RBC 3.32 (L) 4.00 - 5.20 mil/mcL    HGB 10.8 (L) 12.0 - 15.5 g/dL    HCT 34.5 (L) 36.0 - 46.5 %    .9 (H) 78.0 - 100.0 fl    MCH 32.5 26.0 - 34.0 pg    MCHC 31.3 (L) 32.0 - 36.5 g/dL    RDW-CV 13.6 11.0 - 15.0 %     140 - 450 K/mcL    NRBC 0 0 /100 WBC    DIFF TYPE AUTOMATED DIFFERENTIAL     Neutrophil 59 %    LYMPH 23 %    MONO 11 %    EOSIN 6 %    BASO 1 %    Percent Immature Granuloctyes 0 %    Absolute Neutrophil 3.4 1.8 - 7.7 K/mcL    Absolute Lymph 1.3 1.0 - 4.0 K/mcL    Absolute Mono 0.6 0.3 - 0.9 K/mcL    Absolute Eos 0.3 0.1 - 0.5 K/mcL    Absolute Baso 0.0 0.0 - 0.3 K/mcL    Absolute Immature Granulocytes 0.0 0 - 0.2 K/mcl   NT proBNP   Result Value Ref Range    NT proBNP 840 (H) <451 pg/mL   Blood Gas Venous - Point of Care   Result Value Ref Range    PH Venous POC 7.39 7.35 - 7.45 Units    PCO2 Venous 54 (H) 38 - 51 mm Hg    PO2 Venous 29 (L) 35 - 42 mm Hg    HCO3 Venous 33 (H) 22 - 28 mmol/L    Base Excess Venous 6 (H) 0 - 2 mmol/L    O2 SAT Venous 54 (L) 60 - 80 %   Chem 8 Panel - Point of Care   Result Value Ref Range    Sodium  135 - 145 mmol/L    Potassium POC 4.4 3.4 - 5.1 mmol/L    Chloride  98 - 107 mmol/L    CALCIUM IONIZED-POC 1.16 1.15 - 1.29 mmol/L    CO2 Total 32 (H) 19 - 24  mmol/L    GLUCOSE  (H) 70 - 99 mg/dL    BUN POC 23 (H) 6 - 20 mg/dL    HEMATOCRIT POC 36.0 36.0 - 46.5 %    Hemoglobin POC 12.2 12.0 - 15.5 g/dL    ANION GAP POC 14 mmol/L    Creatinine POC 1.10 (H) 0.51 - 0.95 mg/dL    Estimated GFR  (POC) 49     Estimated GFR Non- (POC) 43    Troponin I - Point of Care   Result Value Ref Range    Troponin I POC <0.10 <0.10 ng/mL       Radiology Results     Imaging Results          XR Chest AP or PA (Final result)  Result time 11/16/19 18:11:33    Final result                 Impression:    IMPRESSION: Left basilar pleural/parenchymal opacification appears to be  with interval increase when compared with the most recent chest  radiographic study of 07/18/2019 and raises possibility for underlying  small to moderate degree of left sided pleural effusion and possible  associated left lower lung infiltrate/atelectatic changes. CT examination  of the chest can help in further evaluation.               Narrative:    EXAM:  XR CHEST AP OR PA    CLINICAL STATEMENT:  Shortness of breath.    COMPARISON: Chest radiographic studies dated 07/18/2019, 07/17/2019 and  03/26/2019. Correlation is also made with the CT angiogram study of the  chest dated 09/27/2018.    FINDINGS:   Portable AP view of the chest was obtained on 11/16/2019. Cardiomediastinal  size and silhouette remains stable, tortuosity/and ectasia of the thoracic  aorta is redemonstrated. Cardiac size appears mildly prominent as before.  No overt pulmonary vascular congestion is indicated. Left basilar  pleural/parenchymal opacification is noted appearing more pronounced than  the most recent chest radiographic study of 07/18/2019 raising possibility  for underlying small to moderate sized left-sided pleural effusion and  possible associated left lower lung infiltrate/atelectatic changes. No  evidence for pneumothorax on either side. Retrocardiac air-fluid density  redemonstrated compatible  with a moderate to large sized hiatal hernia.                                ED Medication Orders (From admission, onward)    Ordered Start     Status Ordering Provider    11/16/19 1753 11/16/19 1752  sodium chloride 0.9 % flush bag 25 mL  (Peripheral Flushing)  PRN      Acknowledged MAIN PAZ    11/16/19 1753 11/16/19 2100  sodium chloride (PF) 0.9 % injection 2 mL  (Peripheral Flushing)  2 times per day      Acknowledged MAIN PAZ    11/16/19 1753 11/16/19 1754  albuterol (VENTOLIN) nebulizer 2.5 mg  ONCE      Last MAR action:  Given MAIN PAZ               MDM  Vitals  Vitals:    11/16/19 1740 11/16/19 1744 11/16/19 1801 11/16/19 1830   BP: 178/74  150/67 159/67   Pulse: 89  75 81   Resp: 20      Temp: 97.7 °F (36.5 °C)      TempSrc: Oral      SpO2: (S) (!) 88% (S) 95% 95% 93%   Weight: 52.6 kg      Height: 5' 1\" (1.549 m)          ED Course  Initial Impression: The pt is a 89 year old female who presents to the ED for evaluation of SOB. I performed my initial evaluation of the pt. The plan of care for the pt will be a CXR, cardiac workup, and labs to further evaluate the pt and a breathing tx to treat the pt. The patient understands and agrees with the plan. Any questions were answered.    6:28 PM I rechecked the pt who was in bed. We discussed results of CXR which indicated a pleural effusion secondary to CHF which would explain her SOB. At this time, there is not enough fluid that needs to be drained. Her other labs were unremarkable. She is on Lasix. We discussed plan for Lasix adjustment and pending CHF results. The patient understands and agrees with the plan. Any questions were answered.    6:42 PM I rechecked the pt who was feeling improved. We discussed results of CHF marker which was elevated but not emergent at this time. We discussed plan for going home. She should increase her lasix dose from 20mg to 40mg for 1 week. She can take it all at the same time. She should f/u with  her PCP for further care. The patient understands and agrees with the plan. Any questions were answered.    COLTEN Subramanian has dyspnea with exertion. She has a new left pleural effusion. No evidence of MI, and PE unlikely. No cough or fever to suggest pneumonia. No wheezing. She will increase her lasix to 40mg daily.    Critical Care time spent on this patient outside of billable procedures:  None    Clinical Impression  ED Diagnosis        Final diagnosis    Pleural effusion                The patient was provided with a recommendation to follow up with a primary care provider and obtain reassessment of his/her blood pressure within three months.    Follow Up:  Dago Bryan MD  [address]  [address] [place] [id number]  [phone]    Call in 3 days  Return to the ER for worse symptoms or concerns          Summary of your Discharge Medications      You have not been prescribed any medications.         Pt is discharged to home/self care in stable condition.         I have reviewed the information recorded by the scribe for accuracy and agree with its contents.    ____________________________________________________________________    Ricarda Schwartz acting as a scribe for Dr. Dominguez Joyner  Dictation # [id number]  Scribe: Ricarda Joyner MD  11/16/19 1851     no

## 2024-01-16 NOTE — PROGRESS NOTE ADULT - SUBJECTIVE AND OBJECTIVE BOX
78 year old female from home walk with walker with history of HTN, HLD, vertigo and PSH of bilateral knee replacement on NSAID presented with BRBPR started yesterday. Pt is Bulgarian speaking and daughter at bedside translated for her. Pt stated that she had about 5 BRBPR, not mixed with stool and started feeling dizzy today. Pt has constipation at baseline without history of hemorrhoid and has hard pallet BM every 2-3 days. pt also endorsed mild generalized abd pain. Pt has no similar episodes in the past, EGD and colonoscopy done 3 years ago with normal result to patient knowledge.  Denied loss of weight, loss of appetite, chest pain, palpitation, sob, nausea, vomiting, hematemesis and any other symptoms.     Review of Systems:  Review of Systems: REVIEW OF SYSTEMS:   CONSTITUTIONAL: No weakness, fevers or chills  EYES/ENT: No visual changes;  No vertigo or throat pain   NECK: No pain or stiffness  RESPIRATORY: No cough, wheezing, hemoptysis; No shortness of breath  CARDIOVASCULAR: No chest pain or palpitations  GASTROINTESTINAL: mild diffuse pain. No nausea, vomiting, or hematemesis; No diarrhea or constipation. BRBPR+  GENITOURINARY: No dysuria, frequency or hematuria  NEUROLOGICAL: No numbness or weakness  SKIN: No itching, rashes No other complaint except mentioned as above.	      pt seen in ed [x  ], reg med floor [   ], bed [ x ], chair at bedside [   ], a+o x3 [ x ], lethargic [  ],  nad [x  ]          Allergies    No Known Allergies        Vitals    T(F): 97.9 (05-23-18 @ 10:59), Max: 99 (05-22-18 @ 23:45)  HR: 83 (05-23-18 @ 10:59) (73 - 105)  BP: 133/59 (05-23-18 @ 10:59) (118/56 - 150/85)  RR: 18 (05-23-18 @ 10:59) (18 - 18)  SpO2: 99% (05-23-18 @ 10:59) (97% - 100%)  Wt(kg): --  CAPILLARY BLOOD GLUCOSE          Labs                          9.1    6.6   )-----------( 234      ( 23 May 2018 12:37 )             30.3       05-23    141  |  109<H>  |  11  ----------------------------<  103<H>  3.7   |  23  |  1.11    Ca    9.4      23 May 2018 06:29  Phos  3.0     05-23  Mg     1.8     05-23    TPro  6.7  /  Alb  3.3<L>  /  TBili  0.4  /  DBili  x   /  AST  22  /  ALT  17  /  AlkPhos  68  05-22                Radiology Results      Meds    MEDICATIONS  (STANDING):  carvedilol 25 milliGRAM(s) Oral every 12 hours  ciprofloxacin   IVPB 400 milliGRAM(s) IV Intermittent every 12 hours  donepezil 10 milliGRAM(s) Oral at bedtime  fenofibrate Tablet 145 milliGRAM(s) Oral daily  metroNIDAZOLE  IVPB 500 milliGRAM(s) IV Intermittent every 8 hours  NIFEdipine XL 30 milliGRAM(s) Oral daily  pantoprazole  Injectable 40 milliGRAM(s) IV Push daily  simvastatin 40 milliGRAM(s) Oral at bedtime  sodium chloride 0.9%. 1000 milliLiter(s) (80 mL/Hr) IV Continuous <Continuous>      MEDICATIONS  (PRN):      Physical Exam    Neuro :  no focal deficits  Respiratory: CTA B/L  CV: RRR, S1S2, no murmurs,   Abdominal: Soft, NT, ND +BS,  Extremities: No edema, + peripheral pulses    ASSESSMENT    Hemorrhage of rectum and anus  HLD (hyperlipidemia)  HTN (hypertension)  Vertigo  No significant past surgical history      PLAN 78 year old female from home walk with walker with history of HTN, HLD, vertigo and PSH of bilateral knee replacement on NSAID presented with BRBPR started yesterday. Pt is Nigerien speaking and daughter at bedside translated for her. Pt stated that she had about 5 BRBPR, not mixed with stool and started feeling dizzy today. Pt has constipation at baseline without history of hemorrhoid and has hard pallet BM every 2-3 days. pt also endorsed mild generalized abd pain. Pt has no similar episodes in the past, EGD and colonoscopy done 3 years ago with normal result to patient knowledge.  Denied loss of weight, loss of appetite, chest pain, palpitation, sob, nausea, vomiting, hematemesis and any other symptoms.     Review of Systems:  Review of Systems: REVIEW OF SYSTEMS:   CONSTITUTIONAL: No weakness, fevers or chills  EYES/ENT: No visual changes;  No vertigo or throat pain   NECK: No pain or stiffness  RESPIRATORY: No cough, wheezing, hemoptysis; No shortness of breath  CARDIOVASCULAR: No chest pain or palpitations  GASTROINTESTINAL: mild diffuse pain. No nausea, vomiting, or hematemesis; No diarrhea or constipation. BRBPR+  GENITOURINARY: No dysuria, frequency or hematuria  NEUROLOGICAL: No numbness or weakness  SKIN: No itching, rashes No other complaint except mentioned as above.	      pt seen in ed [x  ], reg med floor [   ], bed [ x ], chair at bedside [   ], a+o x3 [ x ], lethargic [  ],  nad [x  ]          Allergies    No Known Allergies        Vitals    T(F): 97.9 (05-23-18 @ 10:59), Max: 99 (05-22-18 @ 23:45)  HR: 83 (05-23-18 @ 10:59) (73 - 105)  BP: 133/59 (05-23-18 @ 10:59) (118/56 - 150/85)  RR: 18 (05-23-18 @ 10:59) (18 - 18)  SpO2: 99% (05-23-18 @ 10:59) (97% - 100%)  Wt(kg): --  CAPILLARY BLOOD GLUCOSE          Labs                          9.1    6.6   )-----------( 234      ( 23 May 2018 12:37 )             30.3       05-23    141  |  109<H>  |  11  ----------------------------<  103<H>  3.7   |  23  |  1.11    Ca    9.4      23 May 2018 06:29  Phos  3.0     05-23  Mg     1.8     05-23    TPro  6.7  /  Alb  3.3<L>  /  TBili  0.4  /  DBili  x   /  AST  22  /  ALT  17  /  AlkPhos  68  05-22                Radiology Results      Meds    MEDICATIONS  (STANDING):  carvedilol 25 milliGRAM(s) Oral every 12 hours  ciprofloxacin   IVPB 400 milliGRAM(s) IV Intermittent every 12 hours  donepezil 10 milliGRAM(s) Oral at bedtime  fenofibrate Tablet 145 milliGRAM(s) Oral daily  metroNIDAZOLE  IVPB 500 milliGRAM(s) IV Intermittent every 8 hours  NIFEdipine XL 30 milliGRAM(s) Oral daily  pantoprazole  Injectable 40 milliGRAM(s) IV Push daily  simvastatin 40 milliGRAM(s) Oral at bedtime  sodium chloride 0.9%. 1000 milliLiter(s) (80 mL/Hr) IV Continuous <Continuous>      MEDICATIONS  (PRN):      Physical Exam    Neuro :  no focal deficits  Respiratory: CTA B/L  CV: RRR, S1S2, no murmurs,   Abdominal: Soft, NT, ND +BS,  Extremities: No edema, + peripheral pulses    ASSESSMENT    brbpr  colitis  h/o HLD (hyperlipidemia)  HTN (hypertension)  Vertigo  No significant past surgical history      PLAN    cont cipro and flagyl  gi cons noted  start clears and adv as tolerated  colonoscopy in 6-8 weeks   monitor cbc  cont current meds not applicable

## 2024-07-23 NOTE — ED PROVIDER NOTE - CONSTITUTIONAL, MLM
normal... Well appearing, well nourished, awake, alert, oriented to person, place, time/situation and in no apparent distress. Muscle Strain  A muscle strain, or pulled muscle, happens when a muscle is stretched beyond its normal length. This can tear some muscle fibers and cause pain.    Usually, it takes 1–2 weeks to heal from a muscle strain. Full healing normally takes 5–6 weeks.    What are the causes?  This condition is caused when a sudden force is placed on a muscle and stretches it too far. This can happen with a fall, while lifting, or during sports.    What increases the risk?  You are more likely to develop a muscle strain if you are an athlete or you do a lot of physical activity.    What are the signs or symptoms?  Pain.  Tenderness.  Bruising.  Swelling.  Trouble using the muscle.  How is this treated?  This condition is first treated with PRICE therapy. This involves:  Protecting your muscle from being injured again.  Resting your injured muscle.  Icing your injured muscle.  Putting pressure (compression) on your injured muscle. This may be done with a splint or elastic bandage.  Raising (elevating) your injured muscle.  Your doctor may also recommend medicine for pain.    Follow these instructions at home:  If you have a splint that can be taken off:    Wear the splint as told by your doctor. Take it off only as told by your doctor.  Check the skin around the splint every day. Tell your doctor if you see problems.  Loosen the splint if your fingers or toes:  Tingle.  Become numb.  Turn cold and blue.  Keep the splint clean.  If the splint is not waterproof:  Do not let it get wet.  Cover it with a watertight covering when you take a bath or a shower.  Managing pain, stiffness, and swelling    Bag of ice on a towel on the skin.   If told, put ice on your injured area. To do this:  If you have a removable splint, take it off as told by your doctor.  Put ice in a plastic bag.  Place a towel between your skin and the bag.  Leave the ice on for 20 minutes, 2–3 times a day.  Take off the ice if your skin turns bright red. This is very important. If you cannot feel pain, heat, or cold, you have a greater risk of damage to the area.  Move your fingers or toes often.  Raise the injured area above the level of your heart while you are sitting or lying down.  Wear an elastic bandage as told by your doctor. Make sure it is not too tight.  General instructions    Take over-the-counter and prescription medicines only as told by your doctor. This may include:  Medicines for pain and swelling that are taken by mouth or put on the skin.  Medicines to help relax your muscles.  Limit your activity. Rest your injured muscle as told by your doctor. Your doctor may say that gentle movements are okay.  If physical therapy was prescribed, do exercises as told by your doctor.  Do not put pressure on any part of the splint until it is fully hardened. This may take many hours.  Do not smoke or use any products that contain nicotine or tobacco. If you need help quitting, ask your doctor.  Ask your doctor when it is safe to drive if you have a splint.  Keep all follow-up visits.  How is this prevented?  Warm up before you exercise. This helps to prevent more muscle strains.    Contact a doctor if:  You have more pain or swelling in the injured area.  Get help right away if:  You have any of these problems in your injured area:  Numbness.  Tingling.  Less strength than normal.  Summary  A muscle strain is an injury that happens when a muscle is stretched beyond normal length.  This condition is first treated with PRICE therapy. This includes protecting, resting, icing, adding pressure, and raising your injury.  Limit your activity. Rest your injured muscle as told by your doctor. Your doctor may say that gentle movements are okay.  Warm up before you exercise. This helps to prevent more muscle strains.  This information is not intended to replace advice given to you by your health care provider. Make sure you discuss any questions you have with your health care provider.    Document Revised: 04/22/2024 Document Reviewed: 03/07/2022

## 2025-01-10 ENCOUNTER — EMERGENCY (EMERGENCY)
Facility: HOSPITAL | Age: 85
LOS: 1 days | Discharge: ANOTHER HOSPITAL | End: 2025-01-10
Attending: STUDENT IN AN ORGANIZED HEALTH CARE EDUCATION/TRAINING PROGRAM
Payer: MEDICARE

## 2025-01-10 ENCOUNTER — INPATIENT (INPATIENT)
Facility: HOSPITAL | Age: 85
LOS: 4 days | Discharge: HOME HEALTH | DRG: 282 | End: 2025-01-15
Attending: STUDENT IN AN ORGANIZED HEALTH CARE EDUCATION/TRAINING PROGRAM | Admitting: STUDENT IN AN ORGANIZED HEALTH CARE EDUCATION/TRAINING PROGRAM
Payer: MEDICARE

## 2025-01-10 VITALS
HEART RATE: 76 BPM | RESPIRATION RATE: 18 BRPM | WEIGHT: 153 LBS | TEMPERATURE: 98 F | OXYGEN SATURATION: 97 % | DIASTOLIC BLOOD PRESSURE: 70 MMHG | HEIGHT: 62 IN | SYSTOLIC BLOOD PRESSURE: 111 MMHG

## 2025-01-10 VITALS
TEMPERATURE: 98 F | OXYGEN SATURATION: 97 % | SYSTOLIC BLOOD PRESSURE: 108 MMHG | DIASTOLIC BLOOD PRESSURE: 68 MMHG | RESPIRATION RATE: 16 BRPM | HEART RATE: 73 BPM

## 2025-01-10 VITALS
DIASTOLIC BLOOD PRESSURE: 66 MMHG | HEART RATE: 68 BPM | OXYGEN SATURATION: 97 % | WEIGHT: 153 LBS | TEMPERATURE: 98 F | SYSTOLIC BLOOD PRESSURE: 105 MMHG | HEIGHT: 62 IN | RESPIRATION RATE: 18 BRPM

## 2025-01-10 DIAGNOSIS — I21.4 NON-ST ELEVATION (NSTEMI) MYOCARDIAL INFARCTION: ICD-10-CM

## 2025-01-10 LAB
ALBUMIN SERPL ELPH-MCNC: 3.4 G/DL — LOW (ref 3.5–5)
ALBUMIN SERPL ELPH-MCNC: 3.7 G/DL — SIGNIFICANT CHANGE UP (ref 3.3–5)
ALP SERPL-CCNC: 96 U/L — SIGNIFICANT CHANGE UP (ref 40–120)
ALP SERPL-CCNC: 97 U/L — SIGNIFICANT CHANGE UP (ref 40–120)
ALT FLD-CCNC: 41 U/L — SIGNIFICANT CHANGE UP (ref 10–45)
ALT FLD-CCNC: 52 U/L DA — SIGNIFICANT CHANGE UP (ref 10–60)
ANION GAP SERPL CALC-SCNC: 16 MMOL/L — SIGNIFICANT CHANGE UP (ref 5–17)
ANION GAP SERPL CALC-SCNC: 6 MMOL/L — SIGNIFICANT CHANGE UP (ref 5–17)
APTT BLD: 149.8 SEC — CRITICAL HIGH (ref 24.5–35.6)
APTT BLD: 27.2 SEC — SIGNIFICANT CHANGE UP (ref 24.5–35.6)
AST SERPL-CCNC: 80 U/L — HIGH (ref 10–40)
AST SERPL-CCNC: 87 U/L — HIGH (ref 10–40)
BASOPHILS # BLD AUTO: 0.02 K/UL — SIGNIFICANT CHANGE UP (ref 0–0.2)
BASOPHILS # BLD AUTO: 0.03 K/UL — SIGNIFICANT CHANGE UP (ref 0–0.2)
BASOPHILS NFR BLD AUTO: 0.2 % — SIGNIFICANT CHANGE UP (ref 0–2)
BASOPHILS NFR BLD AUTO: 0.4 % — SIGNIFICANT CHANGE UP (ref 0–2)
BILIRUB SERPL-MCNC: 0.4 MG/DL — SIGNIFICANT CHANGE UP (ref 0.2–1.2)
BILIRUB SERPL-MCNC: 0.5 MG/DL — SIGNIFICANT CHANGE UP (ref 0.2–1.2)
BLD GP AB SCN SERPL QL: NEGATIVE — SIGNIFICANT CHANGE UP
BUN SERPL-MCNC: 33 MG/DL — HIGH (ref 7–23)
BUN SERPL-MCNC: 37 MG/DL — HIGH (ref 7–18)
CALCIUM SERPL-MCNC: 10.2 MG/DL — SIGNIFICANT CHANGE UP (ref 8.4–10.5)
CALCIUM SERPL-MCNC: 10.4 MG/DL — SIGNIFICANT CHANGE UP (ref 8.4–10.5)
CHLORIDE SERPL-SCNC: 106 MMOL/L — SIGNIFICANT CHANGE UP (ref 96–108)
CHLORIDE SERPL-SCNC: 109 MMOL/L — HIGH (ref 96–108)
CK MB CFR SERPL CALC: 16.5 NG/ML — SIGNIFICANT CHANGE UP (ref 0–3.6)
CO2 SERPL-SCNC: 16 MMOL/L — LOW (ref 22–31)
CO2 SERPL-SCNC: 22 MMOL/L — SIGNIFICANT CHANGE UP (ref 22–31)
CREAT SERPL-MCNC: 1.69 MG/DL — HIGH (ref 0.5–1.3)
CREAT SERPL-MCNC: 1.69 MG/DL — HIGH (ref 0.5–1.3)
EGFR: 30 ML/MIN/1.73M2 — LOW
EGFR: 30 ML/MIN/1.73M2 — LOW
EOSINOPHIL # BLD AUTO: 0.04 K/UL — SIGNIFICANT CHANGE UP (ref 0–0.5)
EOSINOPHIL # BLD AUTO: 0.06 K/UL — SIGNIFICANT CHANGE UP (ref 0–0.5)
EOSINOPHIL NFR BLD AUTO: 0.6 % — SIGNIFICANT CHANGE UP (ref 0–6)
EOSINOPHIL NFR BLD AUTO: 0.7 % — SIGNIFICANT CHANGE UP (ref 0–6)
GLUCOSE SERPL-MCNC: 96 MG/DL — SIGNIFICANT CHANGE UP (ref 70–99)
GLUCOSE SERPL-MCNC: 97 MG/DL — SIGNIFICANT CHANGE UP (ref 70–99)
HCT VFR BLD CALC: 41.8 % — SIGNIFICANT CHANGE UP (ref 34.5–45)
HCT VFR BLD CALC: 42 % — SIGNIFICANT CHANGE UP (ref 34.5–45)
HGB BLD-MCNC: 14.4 G/DL — SIGNIFICANT CHANGE UP (ref 11.5–15.5)
HGB BLD-MCNC: 14.7 G/DL — SIGNIFICANT CHANGE UP (ref 11.5–15.5)
IMM GRANULOCYTES NFR BLD AUTO: 0.7 % — SIGNIFICANT CHANGE UP (ref 0–0.9)
IMM GRANULOCYTES NFR BLD AUTO: 1 % — HIGH (ref 0–0.9)
INR BLD: 1.11 RATIO — SIGNIFICANT CHANGE UP (ref 0.85–1.16)
INR BLD: 1.22 RATIO — HIGH (ref 0.85–1.16)
LIDOCAIN IGE QN: 58 U/L — SIGNIFICANT CHANGE UP (ref 13–75)
LYMPHOCYTES # BLD AUTO: 1.54 K/UL — SIGNIFICANT CHANGE UP (ref 1–3.3)
LYMPHOCYTES # BLD AUTO: 1.97 K/UL — SIGNIFICANT CHANGE UP (ref 1–3.3)
LYMPHOCYTES # BLD AUTO: 22.1 % — SIGNIFICANT CHANGE UP (ref 13–44)
LYMPHOCYTES # BLD AUTO: 24.6 % — SIGNIFICANT CHANGE UP (ref 13–44)
MAGNESIUM SERPL-MCNC: 2 MG/DL — SIGNIFICANT CHANGE UP (ref 1.6–2.6)
MAGNESIUM SERPL-MCNC: 2 MG/DL — SIGNIFICANT CHANGE UP (ref 1.6–2.6)
MCHC RBC-ENTMCNC: 31.2 PG — SIGNIFICANT CHANGE UP (ref 27–34)
MCHC RBC-ENTMCNC: 31.7 PG — SIGNIFICANT CHANGE UP (ref 27–34)
MCHC RBC-ENTMCNC: 34.4 G/DL — SIGNIFICANT CHANGE UP (ref 32–36)
MCHC RBC-ENTMCNC: 35 G/DL — SIGNIFICANT CHANGE UP (ref 32–36)
MCV RBC AUTO: 90.5 FL — SIGNIFICANT CHANGE UP (ref 80–100)
MCV RBC AUTO: 90.5 FL — SIGNIFICANT CHANGE UP (ref 80–100)
MONOCYTES # BLD AUTO: 0.68 K/UL — SIGNIFICANT CHANGE UP (ref 0–0.9)
MONOCYTES # BLD AUTO: 0.71 K/UL — SIGNIFICANT CHANGE UP (ref 0–0.9)
MONOCYTES NFR BLD AUTO: 8.9 % — SIGNIFICANT CHANGE UP (ref 2–14)
MONOCYTES NFR BLD AUTO: 9.7 % — SIGNIFICANT CHANGE UP (ref 2–14)
NEUTROPHILS # BLD AUTO: 4.62 K/UL — SIGNIFICANT CHANGE UP (ref 1.8–7.4)
NEUTROPHILS # BLD AUTO: 5.2 K/UL — SIGNIFICANT CHANGE UP (ref 1.8–7.4)
NEUTROPHILS NFR BLD AUTO: 64.9 % — SIGNIFICANT CHANGE UP (ref 43–77)
NEUTROPHILS NFR BLD AUTO: 66.2 % — SIGNIFICANT CHANGE UP (ref 43–77)
NRBC # BLD: 0 /100 WBCS — SIGNIFICANT CHANGE UP (ref 0–0)
NRBC # BLD: 0 /100 WBCS — SIGNIFICANT CHANGE UP (ref 0–0)
NT-PROBNP SERPL-SCNC: HIGH PG/ML (ref 0–300)
PLATELET # BLD AUTO: 201 K/UL — SIGNIFICANT CHANGE UP (ref 150–400)
PLATELET # BLD AUTO: 208 K/UL — SIGNIFICANT CHANGE UP (ref 150–400)
POTASSIUM SERPL-MCNC: 4.4 MMOL/L — SIGNIFICANT CHANGE UP (ref 3.5–5.3)
POTASSIUM SERPL-MCNC: 4.5 MMOL/L — SIGNIFICANT CHANGE UP (ref 3.5–5.3)
POTASSIUM SERPL-SCNC: 4.4 MMOL/L — SIGNIFICANT CHANGE UP (ref 3.5–5.3)
POTASSIUM SERPL-SCNC: 4.5 MMOL/L — SIGNIFICANT CHANGE UP (ref 3.5–5.3)
PROT SERPL-MCNC: 6.6 G/DL — SIGNIFICANT CHANGE UP (ref 6–8.3)
PROT SERPL-MCNC: 6.8 G/DL — SIGNIFICANT CHANGE UP (ref 6–8.3)
PROTHROM AB SERPL-ACNC: 13 SEC — SIGNIFICANT CHANGE UP (ref 9.9–13.4)
PROTHROM AB SERPL-ACNC: 13.9 SEC — HIGH (ref 9.9–13.4)
RBC # BLD: 4.62 M/UL — SIGNIFICANT CHANGE UP (ref 3.8–5.2)
RBC # BLD: 4.64 M/UL — SIGNIFICANT CHANGE UP (ref 3.8–5.2)
RBC # FLD: 13 % — SIGNIFICANT CHANGE UP (ref 10.3–14.5)
RBC # FLD: 13 % — SIGNIFICANT CHANGE UP (ref 10.3–14.5)
RH IG SCN BLD-IMP: POSITIVE — SIGNIFICANT CHANGE UP
SODIUM SERPL-SCNC: 137 MMOL/L — SIGNIFICANT CHANGE UP (ref 135–145)
SODIUM SERPL-SCNC: 138 MMOL/L — SIGNIFICANT CHANGE UP (ref 135–145)
TROPONIN I, HIGH SENSITIVITY RESULT: 955.2 NG/L — HIGH
TROPONIN T, HIGH SENSITIVITY RESULT: 104 NG/L — HIGH (ref 0–51)
WBC # BLD: 6.98 K/UL — SIGNIFICANT CHANGE UP (ref 3.8–10.5)
WBC # BLD: 8.02 K/UL — SIGNIFICANT CHANGE UP (ref 3.8–10.5)
WBC # FLD AUTO: 6.98 K/UL — SIGNIFICANT CHANGE UP (ref 3.8–10.5)
WBC # FLD AUTO: 8.02 K/UL — SIGNIFICANT CHANGE UP (ref 3.8–10.5)

## 2025-01-10 PROCEDURE — 82553 CREATINE MB FRACTION: CPT

## 2025-01-10 PROCEDURE — 83690 ASSAY OF LIPASE: CPT

## 2025-01-10 PROCEDURE — 84484 ASSAY OF TROPONIN QUANT: CPT

## 2025-01-10 PROCEDURE — 96375 TX/PRO/DX INJ NEW DRUG ADDON: CPT

## 2025-01-10 PROCEDURE — 93005 ELECTROCARDIOGRAM TRACING: CPT

## 2025-01-10 PROCEDURE — 96374 THER/PROPH/DIAG INJ IV PUSH: CPT

## 2025-01-10 PROCEDURE — 85730 THROMBOPLASTIN TIME PARTIAL: CPT

## 2025-01-10 PROCEDURE — 99285 EMERGENCY DEPT VISIT HI MDM: CPT | Mod: 25

## 2025-01-10 PROCEDURE — 83735 ASSAY OF MAGNESIUM: CPT

## 2025-01-10 PROCEDURE — 99291 CRITICAL CARE FIRST HOUR: CPT | Mod: 25

## 2025-01-10 PROCEDURE — 36415 COLL VENOUS BLD VENIPUNCTURE: CPT

## 2025-01-10 PROCEDURE — 93010 ELECTROCARDIOGRAM REPORT: CPT

## 2025-01-10 PROCEDURE — 99285 EMERGENCY DEPT VISIT HI MDM: CPT

## 2025-01-10 PROCEDURE — 80053 COMPREHEN METABOLIC PANEL: CPT

## 2025-01-10 PROCEDURE — 85610 PROTHROMBIN TIME: CPT

## 2025-01-10 PROCEDURE — 85025 COMPLETE CBC W/AUTO DIFF WBC: CPT

## 2025-01-10 RX ORDER — MAG HYDROX/ALUMINUM HYD/SIMETH 200-200-20
30 SUSPENSION, ORAL (FINAL DOSE FORM) ORAL ONCE
Refills: 0 | Status: COMPLETED | OUTPATIENT
Start: 2025-01-10 | End: 2025-01-10

## 2025-01-10 RX ORDER — HEPARIN SODIUM 1000 [USP'U]/ML
4100 INJECTION, SOLUTION INTRAVENOUS; SUBCUTANEOUS EVERY 6 HOURS
Refills: 0 | Status: DISCONTINUED | OUTPATIENT
Start: 2025-01-10 | End: 2025-01-14

## 2025-01-10 RX ORDER — FAMOTIDINE 20 MG/1
20 TABLET, FILM COATED ORAL ONCE
Refills: 0 | Status: COMPLETED | OUTPATIENT
Start: 2025-01-10 | End: 2025-01-10

## 2025-01-10 RX ORDER — HEPARIN SODIUM 1000 [USP'U]/ML
INJECTION, SOLUTION INTRAVENOUS; SUBCUTANEOUS
Qty: 25000 | Refills: 0 | Status: DISCONTINUED | OUTPATIENT
Start: 2025-01-10 | End: 2025-01-11

## 2025-01-10 RX ORDER — CLOPIDOGREL BISULFATE 75 MG/1
300 TABLET, FILM COATED ORAL ONCE
Refills: 0 | Status: COMPLETED | OUTPATIENT
Start: 2025-01-10 | End: 2025-01-10

## 2025-01-10 RX ORDER — ASPIRIN 81 MG
325 TABLET, DELAYED RELEASE (ENTERIC COATED) ORAL ONCE
Refills: 0 | Status: COMPLETED | OUTPATIENT
Start: 2025-01-10 | End: 2025-01-10

## 2025-01-10 RX ORDER — HEPARIN SODIUM 1000 [USP'U]/ML
4400 INJECTION, SOLUTION INTRAVENOUS; SUBCUTANEOUS EVERY 6 HOURS
Refills: 0 | Status: DISCONTINUED | OUTPATIENT
Start: 2025-01-10 | End: 2025-01-11

## 2025-01-10 RX ORDER — HEPARIN SODIUM 1000 [USP'U]/ML
INJECTION, SOLUTION INTRAVENOUS; SUBCUTANEOUS
Qty: 25000 | Refills: 0 | Status: DISCONTINUED | OUTPATIENT
Start: 2025-01-10 | End: 2025-01-14

## 2025-01-10 RX ORDER — HEPARIN SODIUM 1000 [USP'U]/ML
4100 INJECTION, SOLUTION INTRAVENOUS; SUBCUTANEOUS ONCE
Refills: 0 | Status: COMPLETED | OUTPATIENT
Start: 2025-01-10 | End: 2025-01-10

## 2025-01-10 RX ADMIN — HEPARIN SODIUM 900 UNIT(S)/HR: 1000 INJECTION, SOLUTION INTRAVENOUS; SUBCUTANEOUS at 20:19

## 2025-01-10 RX ADMIN — HEPARIN SODIUM 800 UNIT(S)/HR: 1000 INJECTION, SOLUTION INTRAVENOUS; SUBCUTANEOUS at 16:51

## 2025-01-10 RX ADMIN — CLOPIDOGREL BISULFATE 300 MILLIGRAM(S): 75 TABLET, FILM COATED ORAL at 17:52

## 2025-01-10 RX ADMIN — Medication 30 MILLILITER(S): at 15:47

## 2025-01-10 RX ADMIN — HEPARIN SODIUM 4100 UNIT(S): 1000 INJECTION, SOLUTION INTRAVENOUS; SUBCUTANEOUS at 16:51

## 2025-01-10 RX ADMIN — Medication 325 MILLIGRAM(S): at 15:47

## 2025-01-10 RX ADMIN — FAMOTIDINE 20 MILLIGRAM(S): 20 TABLET, FILM COATED ORAL at 15:47

## 2025-01-10 NOTE — ED ADULT NURSE NOTE - OBJECTIVE STATEMENT
sent from PMD for abnormal EKG, having generalized weakness with loss of appetite and on and off epigastric pain for 2 weeks.

## 2025-01-10 NOTE — ED PROVIDER NOTE - OBJECTIVE STATEMENT
84F with hx of HTN, HLD, GERD, asthma, anemia presenting as transfer from [facility] for NSTEMI. Patient was complaining of generalized weakness/fatigue, epigastric pain, dizziness, and decreased appetite for the past 2 weeks. She was evaluated at hematologist office today and sent to [facility] for ECG changes - new TWI. Loaded with aspirin and plavix and heparin gtt started prior to transfer. Patient denies chest pain. No nausea or vomiting. No fever, chills, URI symptoms. Endorsing SOB, but daughter states she has hx of asthma. She lives alone and ambulates with walker.

## 2025-01-10 NOTE — ED ADULT TRIAGE NOTE - NS ED NURSE AMBULANCES
Yaa Felipe presents today for   Chief Complaint   Patient presents with    Annual Wellness Visit   [address]     Former patient of Dr. Areli Abdi visit    Depression Screening:  3 most recent PHQ Screens 12/1/2020   Little interest or pleasure in doing things Not at all   Feeling down, depressed, irritable, or hopeless Not at all   Total Score PHQ 2 0       Learning Assessment:  Learning Assessment 11/3/2017   PRIMARY LEARNER Patient   BARRIERS PRIMARY LEARNER NONE   PRIMARY LANGUAGE ENGLISH   LEARNER PREFERENCE PRIMARY READING     DEMONSTRATION     LISTENING   ANSWERED BY self   RELATIONSHIP SELF       Abuse Screening:  Abuse Screening Questionnaire 12/1/2020   Do you ever feel afraid of your partner? N   Are you in a relationship with someone who physically or mentally threatens you? N   Is it safe for you to go home? Y       Fall Risk  Fall Risk Assessment, last 12 mths 12/1/2020   Able to walk? Yes   Fall in past 12 months? No         Health Maintenance reviewed and discussed and ordered per Provider. Health Maintenance Due   Topic Date Due    Hepatitis C Screening  1951    DTaP/Tdap/Td series (1 - Tdap) 08/25/1972    Shingrix Vaccine Age 50> (1 of 2) 08/25/2001    GLAUCOMA SCREENING Q2Y  08/25/2016    Pneumococcal 65+ years (1 of 1 - PPSV23) 08/25/2016    Medicare Yearly Exam  03/28/2018    Lipid Screen  05/23/2020    Flu Vaccine (1) 09/01/2020   . Coordination of Care:  1. Have you been to the ER, urgent care clinic since your last visit? Hospitalized since your last visit? no    2. Have you seen or consulted any other health care providers outside of the [address] [place] [address] since your last visit? Include any pap smears or colon screening.  no [facility]

## 2025-01-10 NOTE — ED PROVIDER NOTE - NS ED ATTENDING STATEMENT MOD
I have personally provided the amount of critical care time documented below concurrently with the resident/fellow.  This time excludes time spent on separate procedures and time spent teaching. I have reviewed the resident’s / fellow’s documentation and I agree with the history, exam, and assessment and plan of care.
Head is atraumatic. Head shape is symmetrical.

## 2025-01-10 NOTE — ED PROVIDER NOTE - OBJECTIVE STATEMENT
84-year-old female with past medical history of hypertension, dementia, GERD on omeprazole,  anemia, hyperlipidemia, referred to ED by her hematologist  for concern of abnormal EKG.  Patient was brought to see her doctor today due to having 2 to 3 weeks of persistent malaise, worsening shortness of breath, and intermittent epigastric pain.  EKG performed in the clinic today reviewed by me shows diffuse T wave inversions in leads II, 3, aVF, as well as leads V3 through V6.  Biphasic T wave in V2.  No ST elevations or depressions.  patient denies any recent chest pain,  nausea, vomiting, or diarrhea.  Denies any recent fevers, sore throat, cough.   Patient's daughter at bedside showed me a report of cardiac MRI performed on January 26, 2024 which shows increased thickness of the basal anteroseptal myocardium suspicious for HCM.  No evidence for enhancement to suggest myocarditis, infiltrative disease or prior infarct.  There is normal global and regional systolic left and right ventricular function with EF of 54% and 57%, respectively.  Large hiatal hernia with a portion of the stomach within thoracic cavity.

## 2025-01-10 NOTE — ED ADULT NURSE NOTE - OBJECTIVE STATEMENT
brought in by [facility] EMs from [facility] as cardiac transfer at 1920; comes with Heparin gtt at 8 cc per hour via pump; per EMs pt was given hep bolus of 4100 units at 1651 then began on Hep gtt at 8 cc per hour via pump to peripheral site; next PTT due at 0150; ekg done, actual wt done then placed on bedside cardiac monitor upon arrival to [facility] unit; per ems pt also given at  ED . plavix 600 mg, Mylanta; pt presented to MD office today with epigastric pain x 2 weeks; ekg abnormal at md office and sent to  ED via 911; troponin elevated at ; pt is alert and oriented x 3 with English speaking daughter present. brought in by [facility] EMs from [facility] as cardiac transfer at 1920; comes with Heparin gtt at 8 cc per hour via pump; per EMs pt was given hep bolus of 4100 units at 1651 then began on Hep gtt at 8 cc per hour via pump to peripheral site; next PTT due at 0150; ekg done, actual wt done then placed on bedside cardiac monitor upon arrival to [facility] unit; per ems pt also given at  ED . plavix 600 mg, Mylanta; pt presented to MD office today with epigastric pain x 2 weeks; ekg abnormal at md office and sent to  ED via 911; troponin elevated at ; pt is alert and oriented x 3 with English speaking daughter present; placed on cardiac monitor upon arrival; EKG done upon arrival; actual wt obtained; pt's skin warm and dry with no acute distress.

## 2025-01-10 NOTE — ED ADULT NURSE NOTE - NSFALLHARMRISKINTERV_ED_ALL_ED

## 2025-01-10 NOTE — ED ADULT TRIAGE NOTE - NS ED NURSE DIRECT TO ROOM YN
OCCUPATIONAL THERAPY DISCHARGE SUMMARY    COGNITION: WFL  PRECAUTIONS: NWB to LUE, shoulder abduction sling, painNWB to LUE, shoulder abduction sling, pain    EXPIRATION DATE: 4/14/2021    DISPOSITION: Home with skilled therapy     DISCHARGE SUMMARY: Pt discharged from OT caseload effective 4/10/2021  Participated in a total of 4 OT sessions  Pt achieved 10/13 goals per POC  Remains limited by pain, ROM, weakness and orthopedic Restrictions/WBS  Family aware of pt's current limitations and functional needs  Spouse to assist pt when home from work  Pt aware of AE recommendation, to obtain upon d/c  Recommend home therapy focus on the above noted deficits and further achieve independence in ADL related tasks  Progress to OP OT for continuation of ortho protocol as able  Pt is safe to return home with family support  Goals STG achieved within 1  Week  Performance at Initial Evaluation 4/7/2021  Current Performance (last date completed)   Feeding (cutting, opening containers, etc)  Devorah/I  NOT MET  Min A 4/9 set up needed    Grooming while standing at sink Devorah/I  MET Min A (seated)   4/9 MI hand washing   ADL transfers  Devorah/I  MET Supervision   4/9 MI   Bathroom mobility with appropriate AD Devorah/I  MET Supervision   4/9 MI   UB ADL  Devorah/I  NOT MET  Mod A  4/9 doff sling MI, don mod assist + cues  Overhead gown min assist + v/c dressing  Min bathing UE   4/8 mod assist + ed except brace max assist    LB ADL, AE PRN Devorah/I  NOT MET  Mod A  4/9 + AE Supervision with cues socks and underwear (except CM- see toileting)   4/8 supervision + cues except socks mod assist and max shoes    Toileting/clothing management and hygiene Devorah/I  MET Mod A  *4/9 with underwear management mod assist 2* sweating    Patient ed on management of loose fitting underwear-- later PM MI without underwear to manage and gown only    4/8 Supervision   Dynamic standing balance for increased safety when completing purposeful tasks F+  MET F  4/9 Fair+   Increase standing tolerance for inc'd safety with standing purposeful tasks >5 min   MET 2 min   4/9 5 minutes   Participate in therex 3-5x/week for inc'd overall stamina/activity tolerance for purposeful tasks (per María protocol)  To be completed  MET    4/9 good tolerance to week 1 protocol for reverse total shoulder-- ice provided post session     shower stall transfer  Supervision  MET - pt will await confirmation from surgeon    4/9 simulated with activity supervision-- may not be able to shower upon d/c and instructed to talk with surgeon     Item retrieval for inc'd independence and safety negotiating home environment Devorah/I  MET    4/9  MI   Bed mobility with [facility] flat (pt reports possibly sleeping in recliner upon discharge; discharge goal when confirmed)  Devorah/I  MET - will be using recliner    4/9 MI + rail from right (states rail at home but plans use of recliner)   4/8 min assist from left         Martínez Hernández, MS, OTR/L Yes

## 2025-01-10 NOTE — ED PROVIDER NOTE - ATTENDING CONTRIBUTION TO CARE
------------ATTENDING NOTE------------  pt transferred by EMS from [facility] for concerns of chest pain, T-wave inversions, elevated Tn, c/w NSTEMI, on Heparin IV gtt, HD stbale on arrival, sofr benign abd, equal distal pulses, clear chest w/o distress, awaiting labs/imaging / Cards consults / close reassessments for tx / dispo decisions -->  - Bryson Thompson MD   -------------------------------------------------------

## 2025-01-10 NOTE — ED PROVIDER NOTE - ADMIT DISPOSITION PRESENT ON ADMISSION SEPSIS
Med: NORCO  Dosage: 5-325mg tablet    Vernell states when she bears weight she has left hip pain.  She is inquiring if you will refill her NORCO.   No

## 2025-01-10 NOTE — ED PROVIDER NOTE - CLINICAL SUMMARY MEDICAL DECISION MAKING FREE TEXT BOX
Most recent prior EKG we have available is from 2016, which showed none of the T wave inversions seen on the EKG performed today.  However, I have asked the patient's daughter to reach out to the cardiologist to see if she has a more recent EKG for comparison to today's.  Regardless, diffuse T wave inversions in all inferior leads as well as the anterior lateral leads are concerning for worsening coronary artery disease.  Will give aspirin 324 mg at this time.  Will also give famotidine and Mylanta, given her history of GERD.  Depending on what her EKG today is significantly different from a most recent EKG, she likely requires admission for cardiology evaluation.

## 2025-01-10 NOTE — ED PROVIDER NOTE - PHYSICAL EXAMINATION
Physical Exam  GEN: Alert and oriented x 3, in no acute distress, speaking full clear sentences  HEENT: NC/AT, PERRL, EOMI, normal oropharynx  NECK: Supple, nontender, FROM  CV: RRR, no m/r/g  PULM: CTA bilat, no wheezing/rales/rhonchi  ABD: Soft, minimally tender to epigastrium, nondistended. No organomegaly  EXTR: FROM to all extremities, nontender, no edema  SKIN: Warm, dry, no rash  NEURO: AOx3, speaking full clear sentences, GILL 5/5 strength, ambulating with stable gait

## 2025-01-10 NOTE — ED ADULT NURSE NOTE - AVIAN FLU SYMPTOMS
No Bed/Stretcher in lowest position, wheels locked, appropriate side rails in place/Call bell, personal items and telephone in reach/Instruct patient to call for assistance before getting out of bed/chair/stretcher/Non-slip footwear applied when patient is off stretcher/[place] to call system/Physically safe environment - no spills, clutter or unnecessary equipment/Purposeful proactive rounding/Room/bathroom lighting operational, light cord in reach

## 2025-01-10 NOTE — ED ADULT NURSE REASSESSMENT NOTE - NS ED NURSE REASSESS COMMENT FT1
Patient pending possible transsfer, on cardiac monitor, daughter at bedside. Patient denies chest pain/SOB at this time

## 2025-01-10 NOTE — ED ADULT NURSE NOTE - NSFALLUNIVINTERV_ED_ALL_ED
Bed/Stretcher in lowest position, wheels locked, appropriate side rails in place/Call bell, personal items and telephone in reach/Instruct patient to call for assistance before getting out of bed/chair/stretcher/Non-slip footwear applied when patient is off stretcher/[place] to call system/Physically safe environment - no spills, clutter or unnecessary equipment/Purposeful proactive rounding/Room/bathroom lighting operational, light cord in reach

## 2025-01-10 NOTE — ED ADULT NURSE NOTE - NSHOSCREENINGQ1_ED_ALL_ED
OFFICE VISIT      Patient: Laura Irby   : 2001 MRN: [id number]    Mireya Peoples MD.   No

## 2025-01-10 NOTE — ED PROVIDER NOTE - PROGRESS NOTE DETAILS
Lyle La, ED attending: Pt's daughter showed me an EKG provided by her cardiologist, performed March 2024 shows TWI in II, III, aVF but TW are upright in V3 and V4, inverted in V5-6. I would consider this progression of EKG compared to March 2024. Likely still requires further cardiologic workup and likelyy requires admission vs very close outpatietn follow-up. Lyle La, ED attending: Pt's daughter showed me an EKG provided by her cardiologist, performed March 2024 shows TWI in II, III, aVF but TW are upright in V3 and V4, inverted in V5-6. I would consider this progression of EKG compared to March 2024. Likely still requires further cardiologic workup and likelyy requires admission. Lyle La, ED attending: Troponin I severely elevated at 955.  Patient's presentation is consistent with NSTEMI.  Case discussed with interventional  cardiology fellow Dr. Anna Vivar at 5:10pm,  At this time recommends also giving Plavix 600 mg in addition to the already given aspirin and heparin.  Likely will except for transfer, he will discuss with his attending and get back to me. Lyle La, ED attending: Troponin I severely elevated at 955.  Patient's presentation is consistent with NSTEMI.  Case discussed with interventional  cardiology fellow Dr. Anna Vivar at 5:10pm,  At this time recommends also giving Plavix 600 mg in addition to the already given aspirin and heparin.  Accepted for transfer to [facility] ED, to be evaluated by Dr. Fink, interventional cardiology attending.

## 2025-01-10 NOTE — ED PROVIDER NOTE - PROGRESS NOTE DETAILS
Lolly Rizvi DO (PGY-2): Cards evaluated at bedside. Recommends continued heparin and admission on tele. TTE ordered.

## 2025-01-10 NOTE — ED PROVIDER NOTE - PHYSICAL EXAMINATION
GEN: NAD, awake, eyes open spontaneously  EYES: normal conjunctiva, perrl  ENT: NCAT, MMM, Trachea midline  CHEST/LUNGS: Non-tachypneic, CTAB, bilateral breath sounds  CARDIAC: Non-tachycardic, normal perfusion  ABDOMEN: Soft, NTND, No rebound/guarding  SKIN: No rashes, no petechiae, no vesicles  NEURO: Moving all extremities, no focal neuro deficits.

## 2025-01-10 NOTE — ED PROVIDER NOTE - CARE PLAN
1 Principal Discharge DX:	NSTEMI (non-ST elevation myocardial infarction)   Principal Discharge DX:	NSTEMI (non-ST elevation myocardial infarction)  Secondary Diagnosis:	Prolonged QT interval

## 2025-01-10 NOTE — CONSULT NOTE ADULT - ASSESSMENT
This is a 85 y/o F with PMH of HTN, HLD, ?HCM on MRI (2024 with EF 54%, thick basal anteroseptal myocardium, no myocarditis, no infiltration), T2DM, GERD, large hiatal hernia, anemia, vertigo, dementia (on donepezil), CVA x3 (last 2023), ?MI (unclear when but per daughter, no ONIEL) who was told to come in to ER by her hematologist during outpatient appointment (follow-up for hx of anemia) due to ECG done in outpatient clinic 01/10/25. Patient presented to [place] ER and was transferred to [facility] for further evaluation as a Tier 2 transfer. Cardiology consulted in setting of Hs Troponin of 955. Patient is AAOx3. Patient speaks Syriac but daughter at bedside who wanted to translate. Intrepreter not preferred.     Patient sees a cardiologist outpatient (Dr. Cole) who prescribes her amiodarone per daughter but doesn't know if she has afib/any arrhythmias or why she was prescribed this. Patient is not aware of her cardiac history. History of a heart attack per daughter but says patient had an angiogram but no stents were placed at the time many years ago. Patient's daughter reports symptoms of epigastric pain that has been ongoing for x2 weeks, no exacerbating or relieving factors but appears comfortable on exam. Also lack of appetite and some dizziness (but prior hx of vertigo). Denies chest pain, chest pressure, or SOB at rest or with exertion. Has a home aid that helps with cooking and cleaning -Saturday and is able to do her ADLs independently, patient lives alone. Able to tell me her name, date of birth, year, and the city. Walks around the home but cannot walk a block. Uses a walker to get around. Denies N/V, diarrhea, fevers, cough, sick contacts anytime recently.     EC/2024 (per report, not available to me): TWI II, III, aVF, V5-V6  Hematology Clinic 01/10/25: TWI II, III, aVF, V3-V6 (V3-V4 are the only new findings)  [facility] ER 01/10/25: TWI II, III, aVF, V3-V6. HR 72. Q wave in V1.    Vitals: HR 68, /60s, 98% on RA   Labs: HsTn I 955, CKMB 16.5, WBC 8, Hgb 14.4, Plt 208K, K 4.5, Mag 2.0, BUN 37, Cr 1.69, AST 87, ALT 52    Daughter had a list of her cardiac medications, which include:   ASA 81mg daily, Amiodarone 200mg daily, jardiance 10mg nightly (T2DM), ezetimibe 10mg daily, torsemide 5mg daily, icosapent ethyl 2g BID, carvedilol 12.5mg BID, losartan 50mg nightly, rosuvastatin 40mg nightly    Suspect with troponin elevation without any clear infectious symptoms and TWI, some of which are new, along with risk factors of T2DM, HTN, HLD and ?prior MI and CVA, would treat as NSTEMI and consider ischemic evaluation.     #NSTEMI:   - Has TWI on ECG which seem to have been present on prior ECGs but new in V3-V4 (old ECG not available but per report were not present previously). HsTn elevated to 955. No clear prior records of ischemic eval  - Atypical epigastric pain at rest, no exacerbating factors, will rule out ACS but no acute cath lab activation needed  - Has a large hiatal hernia and has epigastric pain with could be related  - S/p Plavix 600mg and ASA 324mg at  ER 01/10/25  [] Check BNP  [] Trend trop until peak   [] Cont heparin gtt (s/p bolus)   [] Cont ASA 81mg daily (start 25)- pt takes at home prior to visit  [] Cont plavix 75mg daily (start 25)   [] Check TTE; holosystolic murmur on exam   [] Admit to telemetry   [] Maintain K>4, Mg>2   [] Would appreciate records from patient's cardiologist (unable to say what testing she has done)   [] Rule out intra-abdominal pathology as per primary (has hx of large hiatal hernia)  [] Can consider ischemic evaluation once other etiologies ruled out    #?HCM:  #HLD:   #HTN:   #?Cardiac Hx:   - cMRI 2024 with EF 54%, thick basal anteroseptal myocardium, no myocarditis, no infiltration; can likely follow-up outpatient with cardiologist if no acute concerns on TTE above  [] Cont home ezetimibe 10mg daily, rosuvastatin 40mg nightly, icosapent ethyl 2g BID,   [] Cont home amiodarone 200mg daily, carvedilol 12.5mg BID, losartan 50mg nightly  [] Hold home torsemide 5mg daily for now with DAPHNEY, does not appear hypervolemic      Patient to be discussed with Dr. Richter in AM.     Anna Vivar MD   Cardiology Fellow    This is a 85 y/o F with PMH of HTN, HLD, ?HCM on MRI (2024 with EF 54%, thick basal anteroseptal myocardium, no myocarditis, no infiltration), T2DM, GERD, large hiatal hernia, anemia, vertigo, dementia (on donepezil), CVA x3 (last 2023), ?MI (unclear when but per daughter, no ONIEL) who was told to come in to ER by her hematologist during outpatient appointment (follow-up for hx of anemia) due to ECG done in outpatient clinic 01/10/25. Patient presented to [place] ER and was transferred to [facility] for further evaluation as a Tier 2 transfer. Cardiology consulted in setting of Hs Troponin of 955. Patient is AAOx3. Patient speaks Danish but daughter at bedside who wanted to translate. Intrepreter not preferred.     Patient sees a cardiologist outpatient (Dr. Cole) who prescribes her amiodarone per daughter but doesn't know if she has afib/any arrhythmias or why she was prescribed this. Patient is not aware of her cardiac history. History of a heart attack per daughter but says patient had an angiogram but no stents were placed at the time many years ago. Patient's daughter reports symptoms of epigastric pain that has been ongoing for x2 weeks, no exacerbating or relieving factors but appears comfortable on exam. Also lack of appetite and some dizziness (but prior hx of vertigo). Denies chest pain, chest pressure, or SOB at rest or with exertion. Has a home aid that helps with cooking and cleaning -Saturday and is able to do her ADLs independently, patient lives alone. Able to tell me her name, date of birth, year, and the city. Walks around the home but cannot walk a block. Uses a walker to get around. Denies N/V, diarrhea, fevers, cough, sick contacts anytime recently.     EC/2024 (per report, not available to me): TWI II, III, aVF, V5-V6  Hematology Clinic 01/10/25: TWI II, III, aVF, V3-V6 (V3-V4 are the only new findings)  [facility] ER 01/10/25: TWI II, III, aVF, V3-V6. HR 72. Q wave in V1.    Vitals: HR 68, /60s, 98% on RA   Labs: HsTn I 955, CKMB 16.5, WBC 8, Hgb 14.4, Plt 208K, K 4.5, Mag 2.0, BUN 37, Cr 1.69, AST 87, ALT 52    Daughter had a list of her cardiac medications, which include:   ASA 81mg daily, Amiodarone 200mg daily, jardiance 10mg nightly (T2DM), ezetimibe 10mg daily, torsemide 5mg daily, icosapent ethyl 2g BID, carvedilol 12.5mg BID, losartan 50mg nightly, rosuvastatin 40mg nightly    Suspect with troponin elevation without any clear infectious symptoms and TWI, some of which are new, along with risk factors of T2DM, HTN, HLD and ?prior MI and CVA, would treat as NSTEMI and consider ischemic evaluation.     #NSTEMI:   - Has TWI on ECG which seem to have been present on prior ECGs but new in V3-V4 (old ECG not available but per report were not present previously). HsTn elevated to 955. No clear prior records of ischemic eval  - Atypical epigastric pain at rest, no exacerbating factors, will rule out ACS but no acute cath lab activation needed  - Has a large hiatal hernia and has epigastric pain with could be related  - S/p Plavix 600mg and ASA 324mg at  ER 01/10/25  - BLADIMIR score: 4 points (20% risk at 14 days of: all-cause mortality, new or recurrent MI, or severe recurrent ischemia requiring urgent revascularization)  - LISA score: 147 points (18% probability of death from admission to 6 months)   [] Check BNP  [] Trend trop until peak   [] Check lipid panel, Hgb A1c, TSH  [] Cont heparin gtt (s/p bolus)   [] Cont ASA 81mg daily (start 25)- pt takes at home prior to visit  [] Cont plavix 75mg daily (start 25)   [] Check TTE; holosystolic murmur on exam   [] Admit to telemetry   [] Maintain K>4, Mg>2   [] Would appreciate records from patient's cardiologist (unable to say what testing she has done)   [] Rule out intra-abdominal pathology as per primary (has hx of large hiatal hernia)  [] Can consider ischemic evaluation once other etiologies ruled out    #?HCM:  #HLD:   #HTN:   #?Cardiac Hx:   - cMRI 2024 with EF 54%, thick basal anteroseptal myocardium, no myocarditis, no infiltration; can likely follow-up outpatient with cardiologist if no acute concerns on TTE above  [] Cont home ezetimibe 10mg daily, rosuvastatin 40mg nightly, icosapent ethyl 2g BID,   [] Cont home amiodarone 200mg daily, carvedilol 12.5mg BID, losartan 50mg nightly  [] Hold home torsemide 5mg daily for now with DAPHNEY, does not appear hypervolemic      Patient to be discussed with Dr. Richter in AM. Discussed with interventionalist Dr. Aldana prior to transfer from [facility].     Anna Vivar MD   Cardiology Fellow    This is a 83 y/o F with PMH of HTN, HLD, ?HCM on MRI (2024 with EF 54%, thick basal anteroseptal myocardium, no myocarditis, no infiltration), T2DM, GERD, large hiatal hernia, anemia, vertigo, dementia (on donepezil), CVA x3 (last 2023), ?MI (unclear when but per daughter, no ONIEL) who was told to come in to ER by her hematologist during outpatient appointment (follow-up for hx of anemia) due to ECG done in outpatient clinic 01/10/25. Patient presented to [place] ER and was transferred to [facility] for further evaluation as a Tier 2 transfer. Cardiology consulted in setting of Hs Troponin of 955. Patient is AAOx3. Patient speaks Luxembourgish but daughter at bedside who wanted to translate. Intrepreter not preferred.     Patient sees a cardiologist outpatient (Dr. Cole) who prescribes her amiodarone per daughter but doesn't know if she has afib/any arrhythmias or why she was prescribed this. Patient is not aware of her cardiac history. History of a heart attack per daughter but says patient had an angiogram but no stents were placed at the time many years ago. Patient's daughter reports symptoms of epigastric pain that has been ongoing for x2 weeks, no exacerbating or relieving factors but appears comfortable on exam. Also lack of appetite and some dizziness (but prior hx of vertigo). Denies chest pain, chest pressure, or SOB at rest or with exertion. Has a home aid that helps with cooking and cleaning -Saturday and is able to do her ADLs independently, patient lives alone. Able to tell me her name, date of birth, year, and the city. Walks around the home but cannot walk a block. Uses a walker to get around. Denies N/V, diarrhea, fevers, cough, sick contacts anytime recently.     EC/2024 (per report, not available to me): TWI II, III, aVF, V5-V6  Hematology Clinic 01/10/25: TWI II, III, aVF, V3-V6 (V3-V4 are the only new findings)  [facility] ER 01/10/25: TWI II, III, aVF, V3-V6. HR 72. Q wave in V1.    Vitals: HR 68, /60s, 98% on RA   Labs: HsTn I 955, CKMB 16.5, WBC 8, Hgb 14.4, Plt 208K, K 4.5, Mag 2.0, BUN 37, Cr 1.69, AST 87, ALT 52    Daughter had a list of her cardiac medications, which include:   ASA 81mg daily, Amiodarone 200mg daily, jardiance 10mg nightly (T2DM), ezetimibe 10mg daily, torsemide 5mg daily, icosapent ethyl 2g BID, carvedilol 12.5mg BID, losartan 50mg nightly, rosuvastatin 40mg nightly    Although patient has risk factors, with TWI, some of which are new, along with T2DM, HTN, HLD and ?prior MI and CVA, elevated troponin likely in setting of Type II demand ischemia due to atypical and nonspecific epigastric pain. No clear infectious symptoms.   #Likely Type II Demand Ischemia:   - Has TWI on ECG which seem to have been present on prior ECGs but new in V3-V4 (old ECG not available but per report were not present previously). HsTn elevated to 955. No clear prior records of ischemic eval  - Atypical epigastric pain at rest, no exacerbating factors, will rule out ACS but no acute cath lab activation needed  - Has a large hiatal hernia and has epigastric pain with could be related  - S/p Plavix 600mg and ASA 324mg at  ER 01/10/25  - BLADIMIR score: 4 points (20% risk at 14 days of: all-cause mortality, new or recurrent MI, or severe recurrent ischemia requiring urgent revascularization)  - LISA score: 147 points (18% probability of death from admission to 6 months)   [] Check BNP  [] Trend trop until peak   [] Check lipid panel, Hgb A1c, TSH  [] Would d/c heparin gtt  [] Cont ASA 81mg daily (start 25)- pt takes at home prior to visit  [] Cont plavix 75mg daily (start 25)   [] Check TTE; holosystolic murmur on exam   [] Admit to telemetry   [] Maintain K>4, Mg>2   [] Would appreciate records from patient's cardiologist (unable to say what testing she has done)   [] Rule out intra-abdominal pathology as per primary (has hx of large hiatal hernia)  [] Can consider ischemic evaluation once other etiologies ruled out    #?HCM:  #HLD:   #HTN:   #?Cardiac Hx:   - cMRI 2024 with EF 54%, thick basal anteroseptal myocardium, no myocarditis, no infiltration; can likely follow-up outpatient with cardiologist if no acute concerns on TTE above  [] Cont home ezetimibe 10mg daily, rosuvastatin 40mg nightly, icosapent ethyl 2g BID,   [] Cont home amiodarone 200mg daily, carvedilol 12.5mg BID, losartan 50mg nightly  [] Hold home torsemide 5mg daily for now with DAPHNEY, does not appear hypervolemic      Patient to be discussed with Dr. Richter in AM. Discussed with interventionalist Dr. Aldana prior to transfer from [facility].     Anna Vivar MD   Cardiology Fellow    This is a 85 y/o F with PMH of HTN, HLD, ?HCM on MRI (2024 with EF 54%, thick basal anteroseptal myocardium, no myocarditis, no infiltration), T2DM, GERD, large hiatal hernia, anemia, vertigo, dementia (on donepezil), CVA x3 (last 2023), ?MI (unclear when but per daughter, no ONIEL) who was told to come in to ER by her hematologist during outpatient appointment (follow-up for hx of anemia) due to ECG done in outpatient clinic 01/10/25. Patient presented to [place] ER and was transferred to [facility] for further evaluation as a Tier 2 transfer. Cardiology consulted in setting of Hs Troponin of 955. Patient is AAOx3. Patient speaks Czech but daughter at bedside who wanted to translate. Intrepreter not preferred.     Patient sees a cardiologist outpatient (Dr. Cole) who prescribes her amiodarone per daughter but doesn't know if she has afib/any arrhythmias or why she was prescribed this. Patient is not aware of her cardiac history. History of a heart attack per daughter but says patient had an angiogram but no stents were placed at the time many years ago. Patient's daughter reports symptoms of epigastric pain that has been ongoing for x2 weeks, no exacerbating or relieving factors but appears comfortable on exam. Also lack of appetite and some dizziness (but prior hx of vertigo). Denies chest pain, chest pressure, or SOB at rest or with exertion. Has a home aid that helps with cooking and cleaning -Saturday and is able to do her ADLs independently, patient lives alone. Able to tell me her name, date of birth, year, and the city. Walks around the home but cannot walk a block. Uses a walker to get around. Denies N/V, diarrhea, fevers, cough, sick contacts anytime recently.     EC/2024 (per report, not available to me): TWI II, III, aVF, V5-V6  Hematology Clinic 01/10/25: TWI II, III, aVF, V3-V6 (V3-V4 are the only new findings)  [facility] ER 01/10/25: TWI II, III, aVF, V3-V6. HR 72. Q wave in V1.    Vitals: HR 68, /60s, 98% on RA   Labs: HsTn I 955, CKMB 16.5, WBC 8, Hgb 14.4, Plt 208K, K 4.5, Mag 2.0, BUN 37, Cr 1.69, AST 87, ALT 52    Daughter had a list of her cardiac medications, which include:   ASA 81mg daily, Amiodarone 200mg daily, jardiance 10mg nightly (T2DM), ezetimibe 10mg daily, torsemide 5mg daily, icosapent ethyl 2g BID, carvedilol 12.5mg BID, losartan 50mg nightly, rosuvastatin 40mg nightly    Although patient has risk factors, with TWI, some of which are new, along with T2DM, HTN, HLD and ?prior MI and CVA, elevated troponin likely in setting of Type II demand ischemia due to atypical and nonspecific epigastric pain. No clear infectious symptoms.   #Likely Type II Demand Ischemia:   - Has TWI on ECG which seem to have been present on prior ECGs but new in V3-V4 (old ECG not available but per report were not present previously). HsTn elevated to 955. No clear prior records of ischemic eval  - Atypical epigastric pain at rest, no exacerbating factors, will rule out ACS but no acute cath lab activation needed  - Has a large hiatal hernia and has epigastric pain with could be related  - S/p Plavix 600mg and ASA 324mg at  ER 01/10/25  - BLADIMIR score: 4 points (20% risk at 14 days of: all-cause mortality, new or recurrent MI, or severe recurrent ischemia requiring urgent revascularization)  - LISA score: 147 points (18% probability of death from admission to 6 months)   [] Check BNP  [] Trend trop until peak   [] Check lipid panel, Hgb A1c, TSH  [] Would d/c heparin gtt  [] Cont ASA 81mg daily (start 25)- pt takes at home prior to visit  [] Cont plavix 75mg daily (start 25)   [] Check TTE; holosystolic murmur on exam   [] Admit to telemetry   [] Maintain K>4, Mg>2   [] Would appreciate records from patient's cardiologist (unable to say what testing she has done)   [] Rule out intra-abdominal pathology as per primary (has hx of large hiatal hernia)  [] Can consider ischemic evaluation once other etiologies ruled out    #?HCM:  #HLD:   #HTN:   #?Cardiac Hx:   - cMRI 2024 with EF 54%, thick basal anteroseptal myocardium, no myocarditis, no infiltration; can likely follow-up outpatient with cardiologist if no acute concerns on TTE above  [] Cont home ezetimibe 10mg daily, rosuvastatin 40mg nightly, icosapent ethyl 2g BID,   [] Cont home amiodarone 200mg daily, carvedilol 12.5mg BID, losartan 50mg nightly  [] Hold home torsemide 5mg daily for now with DAPHNEY, does not appear hypervolemic      Patient to be discussed with Dr. Richter in AM. Discussed with interventionalist Dr. Aldana prior to transfer from [facility].     Anna Vivar MD   Cardiology Fellow     This patient was seen and examined personally by me and the plan was discussed with the fellow and/or resident above. Amendments were made as necessary to the above. Agree with the excellent note and plan above. 84F w HTN, HL, ?HCM on CMR, CVA, dementia, ?CAD here for elevated trop with abdominal pain. Ongoing workup for epigastric pain with plan for pending ischemic workup. Stop heparin gtt, trending trop, cont asa, plavix. TTE pending and LHC next week.    30 minutes were spent on this encounter for extensive review of medical record details including labs and/or imaging studies and/or adjacent care team and consultant records, as well as review and reconciliation of current medications. Time was spent on obtaining a history, performing physical examination of patient, and answering patient and/or family questions regarding plan of care. Time was also spent discussing plan of care with patient’s other care team members including primary and consulting teams. Time also was spent on documentation of this encounter into the EHR.    Amari Richter MD, MPhil, [facility]  Cardiologist, [facility]  ; [facility] and [facility]  Email: [email or web address] on-service contact/call information, go to [email or web address] and use [email or web address]" to login.  Outpatient Cardiology appointments, call [phone] to arrange with a colleague; I do not have outpatient Cardiology clinic.

## 2025-01-10 NOTE — CONSULT NOTE ADULT - SUBJECTIVE AND OBJECTIVE BOX
All Cardiology service information can be found  on [email or web address], password: [email or web address]    Cardiology Consult Note:     Patient seen and evaluated at bedside    HPI:  This is a 83 y/o F with PMH of HTN, HLD, ?HCM on MRI (2024 with EF 54%, thick basal anteroseptal myocardium, no myocarditis, no infiltration), T2DM, GERD, large hiatal hernia, anemia, vertigo, dementia (on donepezil), CVA x3 (last 2023), ?MI (unclear when but per daughter, no ONIEL) who was told to come in to ER by her hematologist during outpatient appointment (follow-up for hx of anemia) due to ECG done in outpatient clinic 01/10/25. Patient presented to [facility] and was transferred to [facility] for further evaluation as a Tier 2 transfer. Cardiology consulted in setting of Hs Troponin of 955. Patient is AAOx3. Patient speaks Togolese but daughter at bedside who wanted to translate. Intrepreter not preferred.     Patient sees a cardiologist outpatient (Dr. Cole) who prescribes her amiodarone per daughter but doesn't know if she has afib/any arrhythmias or why she was prescribed this. Patient is not aware of her cardiac history. History of a heart attack per daughter but says patient had an angiogram but no stents were placed at the time many years ago. Patient's daughter reports symptoms of epigastric pain that has been ongoing for x2 weeks, no exacerbating or relieving factors but appears comfortable on exam. Also lack of appetite and some dizziness (but prior hx of vertigo). Denies chest pain, chest pressure, or SOB at rest or with exertion. Has a home aid that helps with cooking and cleaning -Saturday and is able to do her ADLs independently, patient lives alone. Able to tell me her name, date of birth, year, and the city. Walks around the home but cannot walk a block. Uses a walker to get around. Denies N/V, diarrhea, fevers, cough, sick contacts anytime recently.     EC/2024 (per report, not available to me): TWI II, III, aVF, V5-V6  Hematology Clinic 01/10/25: TWI II, III, aVF, V3-V6 (V3-V4 are the only new findings)  [facility] ER 01/10/25: TWI II, III, aVF, V3-V6. HR 72. Q wave in V1.    Vitals: HR 68, /60s, 98% on RA   Labs: HsTn I 955, CKMB 16.5, WBC 8, Hgb 14.4, Plt 208K, K 4.5, Mag 2.0, BUN 37, Cr 1.69, AST 87, ALT 52    Daughter had a list of her cardiac medications, which include:   ASA 81mg daily, Amiodarone 200mg daily, jardiance 10mg nightly (T2DM), ezetimibe 10mg daily, torsemide 5mg daily, icosapent ethyl 2g BID, carvedilol 12.5mg BID, losartan 50mg nightly, rosuvastatin 40mg nightly      PMHx:   Vertigo  HTN (hypertension)  HLD (hyperlipidemia)    PSHx:   No significant past surgical history    Allergies:  No Known Allergies      Current Medications:   heparin   Injectable 4400 Unit(s) IV Push every 6 hours PRN  heparin  Infusion.  Unit(s)/Hr IV Continuous <Continuous>      REVIEW OF SYSTEMS:  CONSTITUTIONAL: +weakness. No fevers or chills  EYES/ENT: No visual changes;  No dysphagia  NECK: No pain or stiffness  RESPIRATORY: No cough, wheezing, hemoptysis; No shortness of breath  CARDIOVASCULAR: No chest pain or palpitations; No lower extremity edema  GASTROINTESTINAL: +epigastric pain. No nausea, vomiting, or hematemesis; No diarrhea or constipation. No melena or hematochezia.  BACK: No back pain  GENITOURINARY: No dysuria, frequency or hematuria  NEUROLOGICAL: No numbness or weakness  SKIN: No itching, burning, rashes, or lesions   All other review of systems is negative unless indicated above.    Physical Exam:  T(F): 98.2 (01-10), Max: 98.5 (01-10)  HR: 76 (01-10) (68 - 80)  BP: 111/70 (01-10) (100/57 - 111/70)  RR: 18 (01-10)  SpO2: 97% (01-10)    GEN: NAD  HEENT: EOMI, clear sclera  PULM: CTA b/l, no wheeze  CV: Holosystolic murmur. RRR S1 S2, no JVD  ABD: S, NT, ND  EXT: WWP, no edema  PSYCH: normal affect  SKIN: No rash    CXR: Personally reviewed    Labs: Personally reviewed                        14.7   6.98  )-----------( 201      ( 10 Rod 2025 15:29 )             42.0     01-10    137  |  109[H]  |  37[H]  ----------------------------<  96  4.5   |  22  |  1.69[H]    Ca    10.2      10 Rod 2025 15:29  Mg     2.0     01-10    TPro  6.8  /  Alb  3.4[L]  /  TBili  0.5  /  DBili  x   /  AST  87[H]  /  ALT  52  /  AlkPhos  96  01-10    PT/INR - ( 10 Rod 2025 15:29 )   PT: 13.0 sec;   INR: 1.11 ratio         PTT - ( 10 Rod 2025 15:29 )  PTT:27.2 sec    CARDIAC MARKERS ( 10 Rod 2025 15:29 )  x     / x     / x     / x     / x     / 16.5 ng/mL

## 2025-01-11 DIAGNOSIS — I21.4 NON-ST ELEVATION (NSTEMI) MYOCARDIAL INFARCTION: ICD-10-CM

## 2025-01-11 DIAGNOSIS — I50.9 HEART FAILURE, UNSPECIFIED: ICD-10-CM

## 2025-01-11 DIAGNOSIS — R09.89 OTHER SPECIFIED SYMPTOMS AND SIGNS INVOLVING THE CIRCULATORY AND RESPIRATORY SYSTEMS: ICD-10-CM

## 2025-01-11 DIAGNOSIS — Z29.9 ENCOUNTER FOR PROPHYLACTIC MEASURES, UNSPECIFIED: ICD-10-CM

## 2025-01-11 DIAGNOSIS — N17.9 ACUTE KIDNEY FAILURE, UNSPECIFIED: ICD-10-CM

## 2025-01-11 DIAGNOSIS — I10 ESSENTIAL (PRIMARY) HYPERTENSION: ICD-10-CM

## 2025-01-11 DIAGNOSIS — E78.5 HYPERLIPIDEMIA, UNSPECIFIED: ICD-10-CM

## 2025-01-11 LAB
A1C WITH ESTIMATED AVERAGE GLUCOSE RESULT: 5.9 % — HIGH (ref 4–5.6)
ALBUMIN SERPL ELPH-MCNC: 3.6 G/DL — SIGNIFICANT CHANGE UP (ref 3.3–5)
ALP SERPL-CCNC: 99 U/L — SIGNIFICANT CHANGE UP (ref 40–120)
ALT FLD-CCNC: 38 U/L — SIGNIFICANT CHANGE UP (ref 10–45)
ANION GAP SERPL CALC-SCNC: 13 MMOL/L — SIGNIFICANT CHANGE UP (ref 5–17)
AST SERPL-CCNC: 75 U/L — HIGH (ref 10–40)
BASOPHILS # BLD AUTO: 0.04 K/UL — SIGNIFICANT CHANGE UP (ref 0–0.2)
BASOPHILS NFR BLD AUTO: 0.5 % — SIGNIFICANT CHANGE UP (ref 0–2)
BILIRUB SERPL-MCNC: 0.4 MG/DL — SIGNIFICANT CHANGE UP (ref 0.2–1.2)
BUN SERPL-MCNC: 36 MG/DL — HIGH (ref 7–23)
CALCIUM SERPL-MCNC: 10.5 MG/DL — SIGNIFICANT CHANGE UP (ref 8.4–10.5)
CHLORIDE SERPL-SCNC: 106 MMOL/L — SIGNIFICANT CHANGE UP (ref 96–108)
CHOLEST SERPL-MCNC: 81 MG/DL — SIGNIFICANT CHANGE UP
CK MB CFR SERPL CALC: 17.4 NG/ML — HIGH (ref 0–3.8)
CO2 SERPL-SCNC: 18 MMOL/L — LOW (ref 22–31)
CREAT ?TM UR-MCNC: 67 MG/DL — SIGNIFICANT CHANGE UP
CREAT SERPL-MCNC: 1.75 MG/DL — HIGH (ref 0.5–1.3)
EGFR: 28 ML/MIN/1.73M2 — LOW
EOSINOPHIL # BLD AUTO: 0.07 K/UL — SIGNIFICANT CHANGE UP (ref 0–0.5)
EOSINOPHIL NFR BLD AUTO: 0.9 % — SIGNIFICANT CHANGE UP (ref 0–6)
ESTIMATED AVERAGE GLUCOSE: 123 MG/DL — HIGH (ref 68–114)
GLUCOSE SERPL-MCNC: 100 MG/DL — HIGH (ref 70–99)
HCT VFR BLD CALC: 42.2 % — SIGNIFICANT CHANGE UP (ref 34.5–45)
HDLC SERPL-MCNC: 32 MG/DL — LOW
HGB BLD-MCNC: 14.2 G/DL — SIGNIFICANT CHANGE UP (ref 11.5–15.5)
IMM GRANULOCYTES NFR BLD AUTO: 0.8 % — SIGNIFICANT CHANGE UP (ref 0–0.9)
LIPID PNL WITH DIRECT LDL SERPL: 35 MG/DL — SIGNIFICANT CHANGE UP
LYMPHOCYTES # BLD AUTO: 1.81 K/UL — SIGNIFICANT CHANGE UP (ref 1–3.3)
LYMPHOCYTES # BLD AUTO: 23.5 % — SIGNIFICANT CHANGE UP (ref 13–44)
MAGNESIUM SERPL-MCNC: 2.4 MG/DL — SIGNIFICANT CHANGE UP (ref 1.6–2.6)
MCHC RBC-ENTMCNC: 31.3 PG — SIGNIFICANT CHANGE UP (ref 27–34)
MCHC RBC-ENTMCNC: 33.6 G/DL — SIGNIFICANT CHANGE UP (ref 32–36)
MCV RBC AUTO: 93 FL — SIGNIFICANT CHANGE UP (ref 80–100)
MONOCYTES # BLD AUTO: 0.74 K/UL — SIGNIFICANT CHANGE UP (ref 0–0.9)
MONOCYTES NFR BLD AUTO: 9.6 % — SIGNIFICANT CHANGE UP (ref 2–14)
NEUTROPHILS # BLD AUTO: 4.99 K/UL — SIGNIFICANT CHANGE UP (ref 1.8–7.4)
NEUTROPHILS NFR BLD AUTO: 64.7 % — SIGNIFICANT CHANGE UP (ref 43–77)
NON HDL CHOLESTEROL: 50 MG/DL — SIGNIFICANT CHANGE UP
NRBC # BLD: 0 /100 WBCS — SIGNIFICANT CHANGE UP (ref 0–0)
OSMOLALITY UR: 439 MOS/KG — SIGNIFICANT CHANGE UP (ref 300–900)
PLATELET # BLD AUTO: 212 K/UL — SIGNIFICANT CHANGE UP (ref 150–400)
POTASSIUM SERPL-MCNC: 4.4 MMOL/L — SIGNIFICANT CHANGE UP (ref 3.5–5.3)
POTASSIUM SERPL-SCNC: 4.4 MMOL/L — SIGNIFICANT CHANGE UP (ref 3.5–5.3)
PROT SERPL-MCNC: 6.5 G/DL — SIGNIFICANT CHANGE UP (ref 6–8.3)
RBC # BLD: 4.54 M/UL — SIGNIFICANT CHANGE UP (ref 3.8–5.2)
RBC # FLD: 13.1 % — SIGNIFICANT CHANGE UP (ref 10.3–14.5)
SODIUM SERPL-SCNC: 137 MMOL/L — SIGNIFICANT CHANGE UP (ref 135–145)
SODIUM UR-SCNC: 23 MMOL/L — SIGNIFICANT CHANGE UP
TRIGL SERPL-MCNC: 67 MG/DL — SIGNIFICANT CHANGE UP
TROPONIN T, HIGH SENSITIVITY RESULT: 63 NG/L — HIGH (ref 0–51)
TSH SERPL-MCNC: 1.85 UIU/ML — SIGNIFICANT CHANGE UP (ref 0.27–4.2)
WBC # BLD: 7.71 K/UL — SIGNIFICANT CHANGE UP (ref 3.8–10.5)
WBC # FLD AUTO: 7.71 K/UL — SIGNIFICANT CHANGE UP (ref 3.8–10.5)

## 2025-01-11 PROCEDURE — 99221 1ST HOSP IP/OBS SF/LOW 40: CPT

## 2025-01-11 PROCEDURE — 99223 1ST HOSP IP/OBS HIGH 75: CPT

## 2025-01-11 PROCEDURE — 71045 X-RAY EXAM CHEST 1 VIEW: CPT | Mod: 26

## 2025-01-11 RX ORDER — CLOPIDOGREL BISULFATE 75 MG/1
75 TABLET, FILM COATED ORAL DAILY
Refills: 0 | Status: DISCONTINUED | OUTPATIENT
Start: 2025-01-11 | End: 2025-01-15

## 2025-01-11 RX ORDER — CARVEDILOL 25 MG/1
12.5 TABLET, FILM COATED ORAL EVERY 12 HOURS
Refills: 0 | Status: DISCONTINUED | OUTPATIENT
Start: 2025-01-11 | End: 2025-01-15

## 2025-01-11 RX ORDER — ACETAMINOPHEN 80 MG/.8ML
650 SOLUTION/ DROPS ORAL EVERY 6 HOURS
Refills: 0 | Status: DISCONTINUED | OUTPATIENT
Start: 2025-01-11 | End: 2025-01-15

## 2025-01-11 RX ORDER — ROSUVASTATIN 40 MG/1
1 TABLET, FILM COATED ORAL
Refills: 0 | DISCHARGE

## 2025-01-11 RX ORDER — GINKGO BILOBA 40 MG
3 CAPSULE ORAL AT BEDTIME
Refills: 0 | Status: DISCONTINUED | OUTPATIENT
Start: 2025-01-11 | End: 2025-01-15

## 2025-01-11 RX ORDER — AMIODARONE HYDROCHLORIDE 200 MG/1
200 TABLET ORAL DAILY
Refills: 0 | Status: DISCONTINUED | OUTPATIENT
Start: 2025-01-11 | End: 2025-01-15

## 2025-01-11 RX ORDER — ASPIRIN 81 MG
0 TABLET, DELAYED RELEASE (ENTERIC COATED) ORAL
Refills: 0 | DISCHARGE

## 2025-01-11 RX ORDER — EZETIMIBE 10 MG/1
10 TABLET ORAL DAILY
Refills: 0 | Status: DISCONTINUED | OUTPATIENT
Start: 2025-01-11 | End: 2025-01-15

## 2025-01-11 RX ORDER — ALBUTEROL SULFATE 90 UG/1
2 INHALANT RESPIRATORY (INHALATION) EVERY 6 HOURS
Refills: 0 | Status: DISCONTINUED | OUTPATIENT
Start: 2025-01-11 | End: 2025-01-15

## 2025-01-11 RX ORDER — ICOSAPENT ETHYL 1 G/1
2 CAPSULE ORAL
Refills: 0 | DISCHARGE

## 2025-01-11 RX ORDER — ROSUVASTATIN 40 MG/1
40 TABLET, FILM COATED ORAL AT BEDTIME
Refills: 0 | Status: DISCONTINUED | OUTPATIENT
Start: 2025-01-11 | End: 2025-01-15

## 2025-01-11 RX ORDER — EZETIMIBE 10 MG/1
1 TABLET ORAL
Refills: 0 | DISCHARGE

## 2025-01-11 RX ORDER — HEPARIN SODIUM 1000 [USP'U]/ML
5000 INJECTION, SOLUTION INTRAVENOUS; SUBCUTANEOUS EVERY 12 HOURS
Refills: 0 | Status: DISCONTINUED | OUTPATIENT
Start: 2025-01-11 | End: 2025-01-15

## 2025-01-11 RX ORDER — ASPIRIN 81 MG
81 TABLET, DELAYED RELEASE (ENTERIC COATED) ORAL DAILY
Refills: 0 | Status: DISCONTINUED | OUTPATIENT
Start: 2025-01-11 | End: 2025-01-15

## 2025-01-11 RX ORDER — EMPAGLIFLOZIN 10 MG/1
1 TABLET, FILM COATED ORAL
Refills: 0 | DISCHARGE

## 2025-01-11 RX ORDER — LOSARTAN POTASSIUM 100 MG/1
50 TABLET, FILM COATED ORAL AT BEDTIME
Refills: 0 | Status: DISCONTINUED | OUTPATIENT
Start: 2025-01-11 | End: 2025-01-15

## 2025-01-11 RX ADMIN — HEPARIN SODIUM 5000 UNIT(S): 1000 INJECTION, SOLUTION INTRAVENOUS; SUBCUTANEOUS at 18:03

## 2025-01-11 RX ADMIN — ALBUTEROL SULFATE 2 PUFF(S): 90 INHALANT RESPIRATORY (INHALATION) at 16:54

## 2025-01-11 RX ADMIN — EZETIMIBE 10 MILLIGRAM(S): 10 TABLET ORAL at 12:01

## 2025-01-11 RX ADMIN — LOSARTAN POTASSIUM 50 MILLIGRAM(S): 100 TABLET, FILM COATED ORAL at 21:45

## 2025-01-11 RX ADMIN — Medication 81 MILLIGRAM(S): at 12:02

## 2025-01-11 RX ADMIN — AMIODARONE HYDROCHLORIDE 200 MILLIGRAM(S): 200 TABLET ORAL at 06:01

## 2025-01-11 RX ADMIN — ROSUVASTATIN 40 MILLIGRAM(S): 40 TABLET, FILM COATED ORAL at 21:34

## 2025-01-11 RX ADMIN — CLOPIDOGREL BISULFATE 75 MILLIGRAM(S): 75 TABLET, FILM COATED ORAL at 12:01

## 2025-01-11 NOTE — H&P ADULT - TIME BILLING
Need to interview and examine patient, language barrier, review cardiology recommendations, provide counseling, coordinate care, place orders, document, personally review imaging, ekg and review prior medical records

## 2025-01-11 NOTE — PROGRESS NOTE ADULT - PROBLEM SELECTOR PLAN 4
-Trend BP  -Cont. Coreg BID  -Cont. Losartan daily  -See above - C/w carvedilol and losartan  - Trend blood pressure

## 2025-01-11 NOTE — H&P ADULT - PROBLEM SELECTOR PLAN 4
-On Zetia QHS  -On Vascepa BID  -Cont. Rosuvastatin QHS -Trend BP  -Cont. Coreg BID  -Cont. Losartan daily  -See above

## 2025-01-11 NOTE — PROGRESS NOTE ADULT - PROBLEM SELECTOR PLAN 2
Crt 1.69, elevated compared to prior records. Unclear if DAPHNEY or CKD  -Trend BMP  -Renal dose medications  -Cont. Losartan for now - On presentation, Cr 1.69 (prior values)  - C/w losartan for now  - Monitor urine output  - Trend BMP  - Renal dose medications  - Avoid nephrotoxins

## 2025-01-11 NOTE — H&P ADULT - PROBLEM SELECTOR PLAN 1
Note elevated troponin. Currently chest pain free. Appreciate cardiology recommendations. Patient cannot recall her prior cardiac history.    -Cont. Heparin gtt, Trend PTT  -Cont. Asa and Plavix daily  -Cont. Rosuvastatin QHS and lipid meds below  -TTE  -Telemetry  -Trend cardiac enzymes  -F/u Cardiology recommendations  -Lipid profile, A1c, TSH Note elevated troponin. Currently chest pain free. Appreciate cardiology recommendations. Patient cannot recall her prior cardiac history.    -Cont. Heparin gtt, Trend PTT  -Cont. Asa and Plavix daily  -Cont. Rosuvastatin QHS and lipid meds below  -TTE  -Telemetry  -Trend cardiac enzymes  -F/u Cardiology recommendations  -Lipid profile, A1c, TSH  -CXR Note elevated troponin. Currently chest pain free. Appreciate cardiology recommendations. Patient cannot recall her prior cardiac history. Seems to describe angiogram in past at some point.    -Cont. Heparin gtt, Trend PTT  -Cont. Asa and Plavix daily  -Cont. Rosuvastatin QHS and lipid meds below  -TTE  -Telemetry  -Trend cardiac enzymes  -F/u Cardiology recommendations  -Lipid profile, A1c, TSH  -CXR

## 2025-01-11 NOTE — H&P ADULT - PROBLEM SELECTOR PLAN 3
-Trend BP  -Cont. Coreg BID  -Cont. Losartan daily  -See above Possible hx of heart failure? Appreciate cardiology recommendations    -Holding Torsemide for now, pt seems euvolemic to dry  -Cont. amiodarone for now  -Cont. Coreg and Losartan  -TTE  -F/u cardiology recommendations

## 2025-01-11 NOTE — H&P ADULT - ASSESSMENT
84F w/ hx of HTN, dementia, GERD on omeprazole,  anemia, hyperlipidemia, referred to ED by her hematologist?  for concern of abnormal EKG found to have NSTEMI Z Plasty Text: The lesion was extirpated to the level of the fat with a #15 scalpel blade.  Given the location of the defect, shape of the defect and the proximity to free margins a Z-plasty was deemed most appropriate for repair.  Using a sterile surgical marker, the appropriate transposition arms of the Z-plasty were drawn incorporating the defect and placing the expected incisions within the relaxed skin tension lines where possible.    The area thus outlined was incised deep to adipose tissue with a #15 scalpel blade.  The skin margins were undermined to an appropriate distance in all directions utilizing iris scissors.  The opposing transposition arms were then transposed into place in opposite direction and anchored with interrupted buried subcutaneous sutures.

## 2025-01-11 NOTE — H&P ADULT - NSHPLABSRESULTS_GEN_ALL_CORE
I have reviewed the labs, imaging and ekg. EKG with NSR HR 72 QTc 516 TWIs in inferior leads and V1-V6.

## 2025-01-11 NOTE — PROGRESS NOTE ADULT - PROBLEM SELECTOR PLAN 3
Possible hx of heart failure? Appreciate cardiology recommendations    -Holding Torsemide for now, pt seems euvolemic to dry  -Cont. amiodarone for now  -Cont. Coreg and Losartan  -TTE  -F/u cardiology recommendations - BNP 02079  - Currently euvolemic  - Cards recs appreciated - f/u TTE  - Holding Torsemide for now  - C/w amiodarone  - C/w carvedilol and losartan  - Monitor fluid status

## 2025-01-11 NOTE — H&P ADULT - PROBLEM SELECTOR PLAN 2
Crt 1.69, elevated compared to prior records. Unclear if DAPHNEY or CKD  -Trend BMP  -Renal dose medications  -Cont. Losartan for now

## 2025-01-11 NOTE — H&P ADULT - HISTORY OF PRESENT ILLNESS
84F w/ hx of HTN, dementia, GERD on omeprazole,  anemia, hyperlipidemia, referred to ED by her hematologist?  for concern of abnormal EKG.  Patient was brought to see her doctor today due to having 2 to 3 weeks of persistent malaise, worsening shortness of breath/ [place], and intermittent epigastric pain. Cannot walk up 3 flights of stairs to her apartment without pausing. Sent to hospital with abnormal EKG. Denies any recent fevers, sore throat, cough. Patient's daughter showed ER provider at [place] report of cardiac MRI performed on January 26, 2024 which shows increased thickness of the basal anteroseptal myocardium suspicious for HCM.  No evidence for enhancement to suggest myocarditis, infiltrative disease or prior infarct.  There is normal global and regional systolic left and right ventricular function with EF of 54% and 57%, respectively.  Large hiatal hernia with a portion of the stomach within thoracic cavity. In [place] patient was found to have NSTEMI and transferred to [facility] for NSTEMI. May have had angiogram in past but not clear.    In ER: Given ASA 324mg, Plavix 300mg, Heparin gtt, Maalox, pepcid 20mg IV

## 2025-01-11 NOTE — PROGRESS NOTE ADULT - PROBLEM SELECTOR PLAN 5
-On Zetia QHS  -On Vascepa BID  -Cont. Rosuvastatin QHS - Home regimen includes ezetimibe, rosuvastatin, and Vascepa fish oil  - C/w ezetimibe and rosuvastatin

## 2025-01-11 NOTE — PROGRESS NOTE ADULT - PROBLEM SELECTOR PLAN 1
Note elevated troponin. Currently chest pain free. Appreciate cardiology recommendations. Patient cannot recall her prior cardiac history. Seems to describe angiogram in past at some point.    -Cont. Heparin gtt, Trend PTT  -Cont. Asa and Plavix daily  -Cont. Rosuvastatin QHS and lipid meds below  -TTE  -Telemetry  -Trend cardiac enzymes  -F/u Cardiology recommendations  -Lipid profile, A1c, TSH  -CXR - Note elevated troponin (104 --> 63). Currently chest pain free  - A1c 5.9  - Lipid profile - HDL 32 (low), otherwise wnl  - Cards recs appreciated - f/u TTE, c/w DAPT and statin, discontinue heparin gtt  - Monitor on telemetry  - Follows with outpatient Cardiologist Wendy Aiken (/[phone]). Will discuss with outpatient Cardiologist regarding prior cardiac workup

## 2025-01-11 NOTE — PROGRESS NOTE ADULT - SUBJECTIVE AND OBJECTIVE BOX
Contact Information:  Enzo Walker II, MD, MPH  Internal Medicine    DENNIS OREILLY, MRN-[id number]    Patient is a 84y old  Female who presents with a chief complaint of NSTEMI (11 Jan 2025 01:18)      OVERNIGHT EVENTS/INTERVAL/SUBJECTIVE:    CONSTITUTIONAL: No weakness. No fatigue. No fever.  HEAD: No head trauma.   EYES: No vision changes.  ENT: No hearing changes or tinnitus. No ear pain. No changes in smell. No nasal congestion or discharge. No sore throat. No voice hoarseness.   NECK: No neck pain or stiffness. No lumps.  RESPIRATORY: No cough. No SOB. No wheezing. No hemoptysis.   CARDIOVASCULAR: No chest pain. No palpitations.   GASTROINTESTINAL: No dysphagia. No ABD pain. No distension. No constipation. No diarrhea. No pain with defecation. No hematemesis. No hematochezia or melena.  BACK: No back pain.  GENITOURINARY: No dysuria. No frequency or urgency. No hesitancy. No incontinence. No urinary retention. No suprapubic pain. No hematuria.  EXTREMITY: No swelling.  MUSCULOSKELETAL: No joint pain or swelling. No fractures. No stiffness.    SKIN: No rashes. No itching. No skin, hair, or nail changes.  NEUROLOGICAL: No weakness or paralysis. No lightheadedness or dizziness. No HA. No numbness or tingling.   PSYCHIATRIC: No depression.       OBJECTIVE:  Vital Signs Last 24 Hrs  T(C): 36.4 (11 Jan 2025 12:40), Max: 36.9 (10 Rod 2025 18:48)  T(F): 97.5 (11 Jan 2025 12:40), Max: 98.5 (10 Rod 2025 18:48)  HR: 68 (11 Jan 2025 12:40) (68 - 80)  BP: 98/56 (11 Jan 2025 12:40) (94/62 - 112/92)  BP(mean): 72 (11 Jan 2025 04:37) (72 - 74)  RR: 18 (11 Jan 2025 12:40) (16 - 20)  SpO2: 98% (11 Jan 2025 12:40) (95% - 98%)    Parameters below as of 11 Jan 2025 12:40  Patient On (Oxygen Delivery Method): room air      I&O's Summary    11 Jan 2025 07:01  -  11 Jan 2025 15:37  --------------------------------------------------------  IN: 360 mL / OUT: 0 mL / NET: 360 mL        MEDICATIONS  (STANDING):  aMIOdarone    Tablet 200 milliGRAM(s) Oral daily  aspirin enteric coated 81 milliGRAM(s) Oral daily  carvedilol 12.5 milliGRAM(s) Oral every 12 hours  clopidogrel Tablet 75 milliGRAM(s) Oral daily  ezetimibe 10 milliGRAM(s) Oral daily  losartan 50 milliGRAM(s) Oral at bedtime  rosuvastatin 40 milliGRAM(s) Oral at bedtime    MEDICATIONS  (PRN):  acetaminophen     Tablet .. 650 milliGRAM(s) Oral every 6 hours PRN Temp greater or equal to 38C (100.4F), Mild Pain (1 - 3)  albuterol    90 MICROgram(s) HFA Inhaler 2 Puff(s) Inhalation every 6 hours PRN Shortness of Breath and/or Wheezing  melatonin 3 milliGRAM(s) Oral at bedtime PRN Insomnia    Allergies    No Known Allergies    Intolerances        CONSTITUTIONAL: No acute distress. Awake and alert.  HEAD: No evidence of trauma. Structures WNL.  EYES: +PERRL. +EOMI. No scleral icterus. No conjunctival injection.  ENT: Moist oral mucosa. No erythema. No pharyngeal exudates.   NECK: Supple. Appropriate ROM. No stiffness. No masses or lymphadenopathy.  RESPIRATORY: CTAB. No wheezes, rales, or rhonchi. No accessory muscle use. No apparent respiratory distress.  CARDIOVASCULAR: +S1/S2. No audible S3/S4. Regular rate and rhythm. No murmurs, rubs, or gallops. 2+ radial pulses x b/l UE; 2+ DP pulses x b/l LE. No LE swelling or edema.  GASTROINTESTINAL: Soft, nontender, nondistended. +BS. No rebound or guarding.   BACK: No spinal or paraspinal tenderness. No CVA tenderness.  MUSCULOSKELETAL: Spontaneous movement in all extremities.  DERMATOLOGICAL: No abnormal rashes or lesions.  NEUROLOGICAL: CN 2-12 grossly intact. No focal deficits. Sensation intact x 4EXT.   PSYCHIATRIC: Appropriate affect. A&Ox3 (oriented to person, place, and time).                              14.2   7.71  )-----------( 212      ( 11 Jan 2025 06:42 )             42.2     PT/INR - ( 10 Rod 2025 20:28 )   PT: 13.9 sec;   INR: 1.22 ratio         PTT - ( 10 Rod 2025 20:28 )  PTT:149.8 sec  01-11    137  |  106  |  36[H]  ----------------------------<  100[H]  4.4   |  18[L]  |  1.75[H]    Ca    10.5      11 Jan 2025 06:42  Mg     2.4     01-11    TPro  6.5  /  Alb  3.6  /  TBili  0.4  /  DBili  x   /  AST  75[H]  /  ALT  38  /  AlkPhos  99  01-11    CAPILLARY BLOOD GLUCOSE        LIVER FUNCTIONS - ( 11 Jan 2025 06:42 )  Alb: 3.6 g/dL / Pro: 6.5 g/dL / ALK PHOS: 99 U/L / ALT: 38 U/L / AST: 75 U/L / GGT: x           CARDIAC MARKERS ( 11 Jan 2025 06:42 )  x     / x     / x     / x     / 17.4 ng/mL  CARDIAC MARKERS ( 10 Rod 2025 20:28 )  x     / x     / x     / x     / 18.3 ng/mL  CARDIAC MARKERS ( 10 Rod 2025 15:29 )  x     / x     / x     / x     / 16.5 ng/mL      Urinalysis Basic - ( 11 Jan 2025 06:42 )    Color: x / Appearance: x / SG: x / pH: x  Gluc: 100 mg/dL / Ketone: x  / Bili: x / Urobili: x   Blood: x / Protein: x / Nitrite: x   Leuk Esterase: x / RBC: x / WBC x   Sq Epi: x / Non Sq Epi: x / Bacteria: x            RADIOLOGY AND ADDITIONAL TESTS:    CONSULTANT NOTES REVIEWED:    CARE DISCUSSED WITH THE FOLLOWING CONSULTANTS/PROVIDERS: Contact Information:  Enzo Walker II, MD, MPH  Internal Medicine    DENNIS OREILLY, MRN-[id number]    Patient is a 84y old  Female who presents with a chief complaint of NSTEMI (11 Jan 2025 01:18)      OVERNIGHT EVENTS/INTERVAL/SUBJECTIVE:  [id number]. Patient evaluated at bedside, states that she feels well and has no overt complaints. She denies CP, palpitations, lightheadedness, dizziness, SOB, HA, ABD pain, diarrhea, N/V.    CONSTITUTIONAL: No weakness. No fatigue. No fever.  HEAD: No head trauma.   EYES: No vision changes.  ENT: No hearing changes or tinnitus. No ear pain. No changes in smell. No nasal congestion or discharge. No sore throat. No voice hoarseness.   NECK: No neck pain or stiffness. No lumps.  RESPIRATORY: No cough. No SOB. No wheezing. No hemoptysis.   CARDIOVASCULAR: No chest pain. No palpitations.   GASTROINTESTINAL: No dysphagia. No ABD pain. No distension. No constipation. No diarrhea. No pain with defecation. No hematemesis. No hematochezia or melena.  BACK: No back pain.  GENITOURINARY: No dysuria. No frequency or urgency. No hesitancy. No incontinence. No urinary retention. No suprapubic pain. No hematuria.  EXTREMITY: No swelling.  MUSCULOSKELETAL: No joint pain or swelling. No fractures. No stiffness.    SKIN: No rashes. No itching. No skin, hair, or nail changes.  NEUROLOGICAL: No weakness or paralysis. No lightheadedness or dizziness. No HA. No numbness or tingling.   PSYCHIATRIC: No depression.       OBJECTIVE:  Vital Signs Last 24 Hrs  T(C): 36.4 (11 Jan 2025 12:40), Max: 36.9 (10 Rod 2025 18:48)  T(F): 97.5 (11 Jan 2025 12:40), Max: 98.5 (10 Rod 2025 18:48)  HR: 68 (11 Jan 2025 12:40) (68 - 80)  BP: 98/56 (11 Jan 2025 12:40) (94/62 - 112/92)  BP(mean): 72 (11 Jan 2025 04:37) (72 - 74)  RR: 18 (11 Jan 2025 12:40) (16 - 20)  SpO2: 98% (11 Jan 2025 12:40) (95% - 98%)    Parameters below as of 11 Jan 2025 12:40  Patient On (Oxygen Delivery Method): room air      I&O's Summary    11 Jan 2025 07:01  -  11 Jan 2025 15:37  --------------------------------------------------------  IN: 360 mL / OUT: 0 mL / NET: 360 mL        MEDICATIONS  (STANDING):  aMIOdarone    Tablet 200 milliGRAM(s) Oral daily  aspirin enteric coated 81 milliGRAM(s) Oral daily  carvedilol 12.5 milliGRAM(s) Oral every 12 hours  clopidogrel Tablet 75 milliGRAM(s) Oral daily  ezetimibe 10 milliGRAM(s) Oral daily  losartan 50 milliGRAM(s) Oral at bedtime  rosuvastatin 40 milliGRAM(s) Oral at bedtime    MEDICATIONS  (PRN):  acetaminophen     Tablet .. 650 milliGRAM(s) Oral every 6 hours PRN Temp greater or equal to 38C (100.4F), Mild Pain (1 - 3)  albuterol    90 MICROgram(s) HFA Inhaler 2 Puff(s) Inhalation every 6 hours PRN Shortness of Breath and/or Wheezing  melatonin 3 milliGRAM(s) Oral at bedtime PRN Insomnia    Allergies    No Known Allergies    Intolerances        CONSTITUTIONAL: No acute distress. Awake and alert.  RESPIRATORY: CTAB. No wheezes, rales, or rhonchi. No accessory muscle use. No apparent respiratory distress.  CARDIOVASCULAR: +S1/S2. No audible S3/S4. Regular rate and rhythm. No murmurs, rubs, or gallops. No LE swelling or edema.  GASTROINTESTINAL: Soft, nontender, nondistended. +BS. No rebound or guarding.   MUSCULOSKELETAL: Spontaneous movement in all extremities.  NEUROLOGICAL: CN 2-12 grossly intact. No focal deficits. Sensation intact x 4EXT.   PSYCHIATRIC: Appropriate affect. A&Ox3 (oriented to person, place, and time).                              14.2   7.71  )-----------( 212      ( 11 Jan 2025 06:42 )             42.2     PT/INR - ( 10 Rod 2025 20:28 )   PT: 13.9 sec;   INR: 1.22 ratio         PTT - ( 10 Rod 2025 20:28 )  PTT:149.8 sec  01-11    137  |  106  |  36[H]  ----------------------------<  100[H]  4.4   |  18[L]  |  1.75[H]    Ca    10.5      11 Jan 2025 06:42  Mg     2.4     01-11    TPro  6.5  /  Alb  3.6  /  TBili  0.4  /  DBili  x   /  AST  75[H]  /  ALT  38  /  AlkPhos  99  01-11    CAPILLARY BLOOD GLUCOSE        LIVER FUNCTIONS - ( 11 Jan 2025 06:42 )  Alb: 3.6 g/dL / Pro: 6.5 g/dL / ALK PHOS: 99 U/L / ALT: 38 U/L / AST: 75 U/L / GGT: x           CARDIAC MARKERS ( 11 Jan 2025 06:42 )  x     / x     / x     / x     / 17.4 ng/mL  CARDIAC MARKERS ( 10 Rod 2025 20:28 )  x     / x     / x     / x     / 18.3 ng/mL  CARDIAC MARKERS ( 10 Rod 2025 15:29 )  x     / x     / x     / x     / 16.5 ng/mL      Urinalysis Basic - ( 11 Jan 2025 06:42 )    Color: x / Appearance: x / SG: x / pH: x  Gluc: 100 mg/dL / Ketone: x  / Bili: x / Urobili: x   Blood: x / Protein: x / Nitrite: x   Leuk Esterase: x / RBC: x / WBC x   Sq Epi: x / Non Sq Epi: x / Bacteria: x            RADIOLOGY AND ADDITIONAL TESTS:    CONSULTANT NOTES REVIEWED:    CARE DISCUSSED WITH THE FOLLOWING CONSULTANTS/PROVIDERS:

## 2025-01-11 NOTE — H&P ADULT - NSHPPHYSICALEXAM_GEN_ALL_CORE
Vital Signs Last 24 Hrs  T(C): 36.4 (01-10-25 @ 22:30), Max: 36.9 (01-10-25 @ 18:48)  T(F): 97.6 (01-10-25 @ 22:30), Max: 98.5 (01-10-25 @ 18:48)  HR: 72 (01-11-25 @ 01:00) (68 - 80)  BP: 112/92 (01-11-25 @ 01:00) (100/57 - 112/92)  BP(mean): --  RR: 20 (01-11-25 @ 01:00) (16 - 20)  SpO2: 95% (01-11-25 @ 01:00) (95% - 97%)

## 2025-01-12 LAB
ALBUMIN SERPL ELPH-MCNC: 3.4 G/DL — SIGNIFICANT CHANGE UP (ref 3.3–5)
ALP SERPL-CCNC: 92 U/L — SIGNIFICANT CHANGE UP (ref 40–120)
ALT FLD-CCNC: 38 U/L — SIGNIFICANT CHANGE UP (ref 10–45)
ANION GAP SERPL CALC-SCNC: 14 MMOL/L — SIGNIFICANT CHANGE UP (ref 5–17)
AST SERPL-CCNC: 64 U/L — HIGH (ref 10–40)
BILIRUB SERPL-MCNC: 0.4 MG/DL — SIGNIFICANT CHANGE UP (ref 0.2–1.2)
BUN SERPL-MCNC: 31 MG/DL — HIGH (ref 7–23)
CALCIUM SERPL-MCNC: 10 MG/DL — SIGNIFICANT CHANGE UP (ref 8.4–10.5)
CHLORIDE SERPL-SCNC: 108 MMOL/L — SIGNIFICANT CHANGE UP (ref 96–108)
CO2 SERPL-SCNC: 18 MMOL/L — LOW (ref 22–31)
CREAT SERPL-MCNC: 1.54 MG/DL — HIGH (ref 0.5–1.3)
EGFR: 33 ML/MIN/1.73M2 — LOW
GLUCOSE BLDC GLUCOMTR-MCNC: 100 MG/DL — HIGH (ref 70–99)
GLUCOSE SERPL-MCNC: 88 MG/DL — SIGNIFICANT CHANGE UP (ref 70–99)
HCT VFR BLD CALC: 39.8 % — SIGNIFICANT CHANGE UP (ref 34.5–45)
HGB BLD-MCNC: 13.5 G/DL — SIGNIFICANT CHANGE UP (ref 11.5–15.5)
MCHC RBC-ENTMCNC: 31.6 PG — SIGNIFICANT CHANGE UP (ref 27–34)
MCHC RBC-ENTMCNC: 33.9 G/DL — SIGNIFICANT CHANGE UP (ref 32–36)
MCV RBC AUTO: 93.2 FL — SIGNIFICANT CHANGE UP (ref 80–100)
NRBC # BLD: 0 /100 WBCS — SIGNIFICANT CHANGE UP (ref 0–0)
PLATELET # BLD AUTO: 210 K/UL — SIGNIFICANT CHANGE UP (ref 150–400)
POTASSIUM SERPL-MCNC: 4.3 MMOL/L — SIGNIFICANT CHANGE UP (ref 3.5–5.3)
POTASSIUM SERPL-SCNC: 4.3 MMOL/L — SIGNIFICANT CHANGE UP (ref 3.5–5.3)
PROT SERPL-MCNC: 6.3 G/DL — SIGNIFICANT CHANGE UP (ref 6–8.3)
RBC # BLD: 4.27 M/UL — SIGNIFICANT CHANGE UP (ref 3.8–5.2)
RBC # FLD: 13.2 % — SIGNIFICANT CHANGE UP (ref 10.3–14.5)
SODIUM SERPL-SCNC: 140 MMOL/L — SIGNIFICANT CHANGE UP (ref 135–145)
UUN UR-MCNC: 572 MG/DL — SIGNIFICANT CHANGE UP
WBC # BLD: 7.26 K/UL — SIGNIFICANT CHANGE UP (ref 3.8–10.5)
WBC # FLD AUTO: 7.26 K/UL — SIGNIFICANT CHANGE UP (ref 3.8–10.5)

## 2025-01-12 PROCEDURE — 99233 SBSQ HOSP IP/OBS HIGH 50: CPT

## 2025-01-12 RX ADMIN — LOSARTAN POTASSIUM 50 MILLIGRAM(S): 100 TABLET, FILM COATED ORAL at 21:21

## 2025-01-12 RX ADMIN — CARVEDILOL 12.5 MILLIGRAM(S): 25 TABLET, FILM COATED ORAL at 17:58

## 2025-01-12 RX ADMIN — Medication 81 MILLIGRAM(S): at 12:15

## 2025-01-12 RX ADMIN — AMIODARONE HYDROCHLORIDE 200 MILLIGRAM(S): 200 TABLET ORAL at 06:00

## 2025-01-12 RX ADMIN — ROSUVASTATIN 40 MILLIGRAM(S): 40 TABLET, FILM COATED ORAL at 21:21

## 2025-01-12 RX ADMIN — HEPARIN SODIUM 5000 UNIT(S): 1000 INJECTION, SOLUTION INTRAVENOUS; SUBCUTANEOUS at 17:58

## 2025-01-12 RX ADMIN — EZETIMIBE 10 MILLIGRAM(S): 10 TABLET ORAL at 12:16

## 2025-01-12 RX ADMIN — HEPARIN SODIUM 5000 UNIT(S): 1000 INJECTION, SOLUTION INTRAVENOUS; SUBCUTANEOUS at 06:00

## 2025-01-12 RX ADMIN — CLOPIDOGREL BISULFATE 75 MILLIGRAM(S): 75 TABLET, FILM COATED ORAL at 12:15

## 2025-01-12 NOTE — PROGRESS NOTE ADULT - PROBLEM SELECTOR PLAN 3
- BNP 36568  - Currently euvolemic  - Cards recs appreciated - f/u TTE  - Holding Torsemide for now  - C/w amiodarone  - C/w carvedilol and losartan  - Monitor fluid status

## 2025-01-12 NOTE — PATIENT PROFILE ADULT - FUNCTIONAL ASSESSMENT - BASIC MOBILITY 6.
3-calculated by average/Not able to assess (calculate score using [facility] averaging method) 3 = A little assistance

## 2025-01-12 NOTE — PROGRESS NOTE ADULT - PROBLEM SELECTOR PLAN 2
- On presentation, Cr 1.69 (prior values)  - C/w losartan for now  - Cr 1.54 1/12  - Monitor urine output  - Trend BMP  - Renal dose medications  - Avoid nephrotoxins

## 2025-01-12 NOTE — PROGRESS NOTE ADULT - PROBLEM SELECTOR PLAN 5
- Home regimen includes ezetimibe, rosuvastatin, and Vascepa fish oil  - C/w ezetimibe and rosuvastatin

## 2025-01-12 NOTE — PROGRESS NOTE ADULT - PROBLEM SELECTOR PLAN 1
- Note elevated troponin (104 --> 63). Currently chest pain free  - A1c 5.9  - Lipid profile - HDL 32 (low), otherwise wnl  - Follows with outpatient Cardiologist Wendy Aiken (/[phone]) - discussed 1/12       - Catheterization 12/2023 demonstrating nonobstructive disease        - cMRI 1/2024 (also present in H&P) which shows increased thickness of the basal anteroseptal myocardium suspicious for HCM; no evidence for enhancement to suggest myocarditis, infiltrative disease or prior infarct; normal global and regional systolic left and right ventricular function with EF of 54% and 57%, respectively       - EKGs 12/2023 and 4/2024 demonstrating TWI in the lateral leads       - History of heavy PVC burden, treated temporarily with amiodarone  - Cards recs appreciated - f/u TTE, c/w DAPT and statin  - Monitor on telemetry

## 2025-01-12 NOTE — PROVIDER CONTACT NOTE (OTHER) - SITUATION
Pt BP 97/53. AM carvedilol hold for parameters SBP >100.
Pt bp 104/64, carvedilol not given per parameter.

## 2025-01-12 NOTE — PROVIDER CONTACT NOTE (OTHER) - ACTION/TREATMENT ORDERED:
KRISTI Vaughn notified, will continue to monitor.
Provider aware. AM carvedilol held for parameters. No further orders at this time.

## 2025-01-12 NOTE — PROGRESS NOTE ADULT - SUBJECTIVE AND OBJECTIVE BOX
Contact Information:  Enzo Walker II, MD, MPH  Internal Medicine    DENNIS OREILLY, MRN-[id number]    Patient is a 84y old  Female who presents with a chief complaint of NSTEMI (11 Jan 2025 15:37)      OVERNIGHT EVENTS/INTERVAL/SUBJECTIVE:  [id number]. Patient evaluated at bedside, states that she feels better, has no acute complaints. She denies CP, lightheadedness, SOB, ABD pain, numbness, tingling, HA, diarrhea    CONSTITUTIONAL: No weakness. No fatigue. No fever.  HEAD: No head trauma.   EYES: No vision changes.  ENT: No hearing changes or tinnitus. No ear pain. No changes in smell. No nasal congestion or discharge. No sore throat. No voice hoarseness.   NECK: No neck pain or stiffness. No lumps.  RESPIRATORY: No cough. No SOB. No wheezing. No hemoptysis.   CARDIOVASCULAR: No chest pain. No palpitations.   GASTROINTESTINAL: No dysphagia. No ABD pain. No distension. No constipation. No diarrhea. No pain with defecation. No hematemesis. No hematochezia or melena.  BACK: No back pain.  GENITOURINARY: No dysuria. No frequency or urgency. No hesitancy. No incontinence. No urinary retention. No suprapubic pain. No hematuria.  EXTREMITY: No swelling.  MUSCULOSKELETAL: No joint pain or swelling. No fractures. No stiffness.    SKIN: No rashes. No itching. No skin, hair, or nail changes.  NEUROLOGICAL: No weakness or paralysis. No lightheadedness or dizziness. No HA. No numbness or tingling.   PSYCHIATRIC: No depression.       OBJECTIVE:  Vital Signs Last 24 Hrs  T(C): 36.6 (12 Jan 2025 12:50), Max: 37 (12 Jan 2025 05:21)  T(F): 97.9 (12 Jan 2025 12:50), Max: 98.6 (12 Jan 2025 05:21)  HR: 70 (12 Jan 2025 12:50) (65 - 74)  BP: 100/62 (12 Jan 2025 12:50) (97/53 - 113/66)  BP(mean): --  RR: 18 (12 Jan 2025 12:50) (18 - 18)  SpO2: 96% (12 Jan 2025 12:50) (95% - 98%)    Parameters below as of 12 Jan 2025 12:50  Patient On (Oxygen Delivery Method): room air      I&O's Summary    11 Jan 2025 07:01  -  12 Jan 2025 07:00  --------------------------------------------------------  IN: 360 mL / OUT: 0 mL / NET: 360 mL    12 Jan 2025 07:01  -  12 Jan 2025 16:36  --------------------------------------------------------  IN: 0 mL / OUT: 900 mL / NET: -900 mL        MEDICATIONS  (STANDING):  aMIOdarone    Tablet 200 milliGRAM(s) Oral daily  aspirin enteric coated 81 milliGRAM(s) Oral daily  carvedilol 12.5 milliGRAM(s) Oral every 12 hours  clopidogrel Tablet 75 milliGRAM(s) Oral daily  ezetimibe 10 milliGRAM(s) Oral daily  heparin   Injectable 5000 Unit(s) SubCutaneous every 12 hours  losartan 50 milliGRAM(s) Oral at bedtime  rosuvastatin 40 milliGRAM(s) Oral at bedtime    MEDICATIONS  (PRN):  acetaminophen     Tablet .. 650 milliGRAM(s) Oral every 6 hours PRN Temp greater or equal to 38C (100.4F), Mild Pain (1 - 3)  albuterol    90 MICROgram(s) HFA Inhaler 2 Puff(s) Inhalation every 6 hours PRN Shortness of Breath and/or Wheezing  melatonin 3 milliGRAM(s) Oral at bedtime PRN Insomnia    Allergies    No Known Allergies    Intolerances        CONSTITUTIONAL: No acute distress. Awake and alert.  RESPIRATORY: CTAB. No wheezes, rales, or rhonchi. No accessory muscle use. No apparent respiratory distress.  CARDIOVASCULAR: +S1/S2. No audible S3/S4. Regular rate and rhythm. No murmurs, rubs, or gallops. No LE swelling or edema.  GASTROINTESTINAL: Soft, nontender, nondistended. +BS. No rebound or guarding.   MUSCULOSKELETAL: Spontaneous movement in all extremities.  NEUROLOGICAL: CN 2-12 grossly intact. No focal deficits. Sensation intact x 4EXT.   PSYCHIATRIC: Appropriate affect. A&Ox3 (oriented to person, place, and time).                              13.5   7.26  )-----------( 210      ( 12 Jan 2025 07:27 )             39.8     PT/INR - ( 10 Rod 2025 20:28 )   PT: 13.9 sec;   INR: 1.22 ratio         PTT - ( 10 Rod 2025 20:28 )  PTT:149.8 sec  01-12    140  |  108  |  31[H]  ----------------------------<  88  4.3   |  18[L]  |  1.54[H]    Ca    10.0      12 Jan 2025 07:26  Mg     2.4     01-11    TPro  6.3  /  Alb  3.4  /  TBili  0.4  /  DBili  x   /  AST  64[H]  /  ALT  38  /  AlkPhos  92  01-12    CAPILLARY BLOOD GLUCOSE      POCT Blood Glucose.: 100 mg/dL (12 Jan 2025 16:20)    LIVER FUNCTIONS - ( 12 Jan 2025 07:26 )  Alb: 3.4 g/dL / Pro: 6.3 g/dL / ALK PHOS: 92 U/L / ALT: 38 U/L / AST: 64 U/L / GGT: x           CARDIAC MARKERS ( 11 Jan 2025 06:42 )  x     / x     / x     / x     / 17.4 ng/mL  CARDIAC MARKERS ( 10 Rod 2025 20:28 )  x     / x     / x     / x     / 18.3 ng/mL      Urinalysis Basic - ( 12 Jan 2025 07:26 )    Color: x / Appearance: x / SG: x / pH: x  Gluc: 88 mg/dL / Ketone: x  / Bili: x / Urobili: x   Blood: x / Protein: x / Nitrite: x   Leuk Esterase: x / RBC: x / WBC x   Sq Epi: x / Non Sq Epi: x / Bacteria: x            RADIOLOGY AND ADDITIONAL TESTS:    CONSULTANT NOTES REVIEWED:    CARE DISCUSSED WITH THE FOLLOWING CONSULTANTS/PROVIDERS:

## 2025-01-12 NOTE — PATIENT PROFILE ADULT - FALL HARM RISK - HARM RISK INTERVENTIONS

## 2025-01-12 NOTE — PROVIDER CONTACT NOTE (OTHER) - ASSESSMENT
Pt aox4, VS as charted. Denies chest pain, no SOB, no distress noted.
Pt A&Ox4.l Pt denies CP, SOB, or lightheadedness. HR 69. NSR on tele. 95% on RA. 18 respirations/

## 2025-01-13 ENCOUNTER — RESULT REVIEW (OUTPATIENT)
Age: 85
End: 2025-01-13

## 2025-01-13 ENCOUNTER — TRANSCRIPTION ENCOUNTER (OUTPATIENT)
Age: 85
End: 2025-01-13

## 2025-01-13 LAB
ANION GAP SERPL CALC-SCNC: 13 MMOL/L — SIGNIFICANT CHANGE UP (ref 5–17)
BUN SERPL-MCNC: 32 MG/DL — HIGH (ref 7–23)
CALCIUM SERPL-MCNC: 10.2 MG/DL — SIGNIFICANT CHANGE UP (ref 8.4–10.5)
CHLORIDE SERPL-SCNC: 109 MMOL/L — HIGH (ref 96–108)
CO2 SERPL-SCNC: 18 MMOL/L — LOW (ref 22–31)
CREAT SERPL-MCNC: 1.62 MG/DL — HIGH (ref 0.5–1.3)
EGFR: 31 ML/MIN/1.73M2 — LOW
GLUCOSE SERPL-MCNC: 80 MG/DL — SIGNIFICANT CHANGE UP (ref 70–99)
HCT VFR BLD CALC: 38.1 % — SIGNIFICANT CHANGE UP (ref 34.5–45)
HGB BLD-MCNC: 12.7 G/DL — SIGNIFICANT CHANGE UP (ref 11.5–15.5)
MCHC RBC-ENTMCNC: 31.1 PG — SIGNIFICANT CHANGE UP (ref 27–34)
MCHC RBC-ENTMCNC: 33.3 G/DL — SIGNIFICANT CHANGE UP (ref 32–36)
MCV RBC AUTO: 93.4 FL — SIGNIFICANT CHANGE UP (ref 80–100)
NRBC # BLD: 0 /100 WBCS — SIGNIFICANT CHANGE UP (ref 0–0)
PLATELET # BLD AUTO: 200 K/UL — SIGNIFICANT CHANGE UP (ref 150–400)
POTASSIUM SERPL-MCNC: 4.5 MMOL/L — SIGNIFICANT CHANGE UP (ref 3.5–5.3)
POTASSIUM SERPL-SCNC: 4.5 MMOL/L — SIGNIFICANT CHANGE UP (ref 3.5–5.3)
RBC # BLD: 4.08 M/UL — SIGNIFICANT CHANGE UP (ref 3.8–5.2)
RBC # FLD: 13.2 % — SIGNIFICANT CHANGE UP (ref 10.3–14.5)
SODIUM SERPL-SCNC: 140 MMOL/L — SIGNIFICANT CHANGE UP (ref 135–145)
WBC # BLD: 5.47 K/UL — SIGNIFICANT CHANGE UP (ref 3.8–10.5)
WBC # FLD AUTO: 5.47 K/UL — SIGNIFICANT CHANGE UP (ref 3.8–10.5)

## 2025-01-13 PROCEDURE — 99233 SBSQ HOSP IP/OBS HIGH 50: CPT

## 2025-01-13 PROCEDURE — 93306 TTE W/DOPPLER COMPLETE: CPT | Mod: 26

## 2025-01-13 PROCEDURE — 99232 SBSQ HOSP IP/OBS MODERATE 35: CPT

## 2025-01-13 RX ADMIN — LOSARTAN POTASSIUM 50 MILLIGRAM(S): 100 TABLET, FILM COATED ORAL at 22:28

## 2025-01-13 RX ADMIN — HEPARIN SODIUM 5000 UNIT(S): 1000 INJECTION, SOLUTION INTRAVENOUS; SUBCUTANEOUS at 17:28

## 2025-01-13 RX ADMIN — CARVEDILOL 12.5 MILLIGRAM(S): 25 TABLET, FILM COATED ORAL at 05:37

## 2025-01-13 RX ADMIN — Medication 81 MILLIGRAM(S): at 11:40

## 2025-01-13 RX ADMIN — EZETIMIBE 10 MILLIGRAM(S): 10 TABLET ORAL at 11:40

## 2025-01-13 RX ADMIN — ROSUVASTATIN 40 MILLIGRAM(S): 40 TABLET, FILM COATED ORAL at 22:28

## 2025-01-13 RX ADMIN — HEPARIN SODIUM 5000 UNIT(S): 1000 INJECTION, SOLUTION INTRAVENOUS; SUBCUTANEOUS at 05:37

## 2025-01-13 RX ADMIN — CLOPIDOGREL BISULFATE 75 MILLIGRAM(S): 75 TABLET, FILM COATED ORAL at 11:40

## 2025-01-13 RX ADMIN — AMIODARONE HYDROCHLORIDE 200 MILLIGRAM(S): 200 TABLET ORAL at 05:37

## 2025-01-13 NOTE — PHYSICAL THERAPY INITIAL EVALUATION ADULT - PERTINENT HX OF CURRENT PROBLEM, REHAB EVAL
84F w/ hx of HTN, dementia, GERD on omeprazole,  anemia, hyperlipidemia, referred to ED by her hematologist?  for concern of abnormal EKG.  Patient was brought to see her doctor today due to having 2 to 3 weeks of persistent malaise, worsening shortness of breath/ [place], and intermittent epigastric pain. Cannot walk up 3 flights of stairs to her apartment without pausing. Sent to hospital with abnormal EKG. Denies any recent fevers, sore throat, cough. Patient's daughter showed ER provider at [facility] report of cardiac MRI performed on January 26, 2024 which shows increased thickness of the basal anteroseptal myocardium suspicious for HCM.  No evidence for enhancement to suggest myocarditis, infiltrative disease or prior infarct.  There is normal global and regional systolic left and right ventricular function with EF of 54% and 57%, respectively.  Large hiatal hernia with a portion of the stomach within thoracic cavity. Hospital course: (1/11) CXR: Hiatal hernia. No active pulmonary disease.

## 2025-01-13 NOTE — PHYSICAL THERAPY INITIAL EVALUATION ADULT - GENERAL OBSERVATIONS, REHAB EVAL
Pt. rec' d in bed, NAD, +IVL, +tele, +prima fit, agreeable to PT [place]. PAST MEDICAL HISTORY:  HTN (hypertension)     Prostate cancer

## 2025-01-13 NOTE — DISCHARGE NOTE NURSING/CASE MANAGEMENT/SOCIAL WORK - PATIENT PORTAL LINK FT
You can access the FollowMyHealth Patient Portal offered by [facility] by registering at the following website: [email or web address]. By joining [employer]’s FollowMyHealth portal, you will also be able to view your health information using other applications (apps) compatible with our system.

## 2025-01-13 NOTE — PROGRESS NOTE ADULT - PROBLEM SELECTOR PLAN 3
- BNP 52939  - Currently euvolemic  - Cards recs appreciated - f/u TTE  - Holding Torsemide for now  - C/w amiodarone  - C/w carvedilol and losartan  - Monitor fluid status

## 2025-01-13 NOTE — PROGRESS NOTE ADULT - SUBJECTIVE AND OBJECTIVE BOX
INTERVAL EVENTS/SUBJ:  No events     Home Medications:  amiodarone 200 mg oral tablet: 1 tab(s) orally once a day (11 Jan 2025 01:36)  aspirin 81 mg oral tablet: orally once a day (11 Jan 2025 01:36)  carvedilol 12.5 mg oral tablet: 1 tab(s) orally every 12 hours (11 Jan 2025 01:36)  ezetimibe 10 mg oral tablet: 1 tab(s) orally once a day (11 Jan 2025 01:36)  Jardiance 10 mg oral tablet: 1 tab(s) orally once a day (at bedtime) (11 Jan 2025 01:36)  losartan 50 mg oral tablet: 1 tab(s) orally once a day (at bedtime) (11 Jan 2025 01:36)  rosuvastatin 40 mg oral tablet: 1 tab(s) orally once a day (at bedtime) (11 Jan 2025 01:36)  torsemide 10 mg oral tablet: 1 tab(s) orally once a day (11 Jan 2025 01:36)  Vascepa 1 g oral capsule: 2 cap(s) orally 2 times a day (11 Jan 2025 01:36)      MEDICATIONS  (STANDING):  aMIOdarone    Tablet 200 milliGRAM(s) Oral daily  aspirin enteric coated 81 milliGRAM(s) Oral daily  carvedilol 12.5 milliGRAM(s) Oral every 12 hours  clopidogrel Tablet 75 milliGRAM(s) Oral daily  ezetimibe 10 milliGRAM(s) Oral daily  heparin   Injectable 5000 Unit(s) SubCutaneous every 12 hours  losartan 50 milliGRAM(s) Oral at bedtime  rosuvastatin 40 milliGRAM(s) Oral at bedtime    MEDICATIONS  (PRN):  acetaminophen     Tablet .. 650 milliGRAM(s) Oral every 6 hours PRN Temp greater or equal to 38C (100.4F), Mild Pain (1 - 3)  albuterol    90 MICROgram(s) HFA Inhaler 2 Puff(s) Inhalation every 6 hours PRN Shortness of Breath and/or Wheezing  melatonin 3 milliGRAM(s) Oral at bedtime PRN Insomnia      Vital Signs Last 24 Hrs  T(C): 36.6 (13 Jan 2025 04:08), Max: 36.9 (12 Jan 2025 17:25)  T(F): 97.9 (13 Jan 2025 04:08), Max: 98.5 (12 Jan 2025 17:25)  HR: 70 (13 Jan 2025 04:08) (70 - 77)  BP: 112/72 (13 Jan 2025 04:08) (95/57 - 115/71)  BP(mean): --  RR: 18 (13 Jan 2025 04:08) (18 - 18)  SpO2: 99% (13 Jan 2025 04:08) (95% - 99%)    Parameters below as of 13 Jan 2025 04:08  Patient On (Oxygen Delivery Method): room air        REVIEW OF SYSTEMS:  As per HPI, otherwise unremarkable.     PHYSICAL EXAM:  Constitutional/Appearance: Normal, Well-developed  HEENT:   Normal oral mucosa, no drainage or redness, supple neck  Lymphatic: No lymphadenopathy  Cardiovascular: Normal S1 S2, No edema, II/VI DIMITRI  Respiratory: Lungs clear to auscultation, respirations non-labored  Psychiatry: A & O x 3, appropriate affect.   Gastrointestinal:  Soft, Non-tender, no distention  Skin: No rashes, No ecchymoses, No cyanosis	  Neurologic: Non-focal, Alert and oriented x 3  Extremities: Normal range of motion  Vascular: Peripheral pulses palpable 2+ bilaterally (radial)    LABS:  CBC Full  -  ( 13 Jan 2025 07:36 )  WBC Count : 5.47 K/uL  RBC Count : 4.08 M/uL  Hemoglobin : 12.7 g/dL  Hematocrit : 38.1 %  Platelet Count - Automated : 200 K/uL  Mean Cell Volume : 93.4 fl  Mean Cell Hemoglobin : 31.1 pg  Mean Cell Hemoglobin Concentration : 33.3 g/dL  Auto Neutrophil # : x  Auto Lymphocyte # : x  Auto Monocyte # : x  Auto Eosinophil # : x  Auto Basophil # : x  Auto Neutrophil % : x  Auto Lymphocyte % : x  Auto Monocyte % : x  Auto Eosinophil % : x  Auto Basophil % : x      01-12    140  |  108  |  31[H]  ----------------------------<  88  4.3   |  18[L]  |  1.54[H]    Ca    10.0      12 Jan 2025 07:26    TPro  6.3  /  Alb  3.4  /  TBili  0.4  /  DBili  x   /  AST  64[H]  /  ALT  38  /  AlkPhos  92  01-12    IMPRESSION AND PLAN: 84F w HTN, HL, ?HCM on CMR, CVA, dementia, ?CAD here for elevated trop with abdominal pain. CP now resolved  -tele  -trop 104 - 63  -[facility] 12/2023 non-obs  -CMR 1/2024 w possible HCM  -TTE pending  -cont asa, plavix  -cont losartan      35 minutes were spent on this encounter for extensive review of medical record details including labs and/or imaging studies and/or adjacent care team and consultant records, as well as review and reconciliation of current medications. Time was spent on obtaining a history, performing physical examination of patient, and answering patient and/or family questions regarding plan of care. Time was also spent discussing plan of care with patient’s other care team members including primary and consulting teams. Time also was spent on documentation of this encounter into the EHR.    ***    Amari Richter MD, MPhil, [facility]  Cardiologist, [facility]  ; [employer] at [facility]  email: [email or web address] and Cardiovascular Surgery on-service contact/call information, go to [email or web address] and use [email or web address]" to login.  Outpatient Cardiology appointments, call  [phone] to arrange with a colleague; I do not have outpatient Cardiology clinic.

## 2025-01-13 NOTE — DISCHARGE NOTE NURSING/CASE MANAGEMENT/SOCIAL WORK - FINANCIAL ASSISTANCE
[facility] provides services at a reduced cost to those who are determined to be eligible through [facility]’s financial assistance program. Information regarding [facility]’s financial assistance program can be found by going to [email or web address] or by calling [phone].

## 2025-01-13 NOTE — PROGRESS NOTE ADULT - SUBJECTIVE AND OBJECTIVE BOX
Lorena Ya MD  Hospitalist  Available on MS Teams      PROGRESS NOTE:     Patient is a 84y old  Female who presents with a chief complaint of NSTEMI (13 Jan 2025 08:35)      SUBJECTIVE / OVERNIGHT EVENTS:   ID [id number].    No acute complaints. Pending TTE.    MEDICATIONS  (STANDING):  aMIOdarone    Tablet 200 milliGRAM(s) Oral daily  aspirin enteric coated 81 milliGRAM(s) Oral daily  carvedilol 12.5 milliGRAM(s) Oral every 12 hours  clopidogrel Tablet 75 milliGRAM(s) Oral daily  ezetimibe 10 milliGRAM(s) Oral daily  heparin   Injectable 5000 Unit(s) SubCutaneous every 12 hours  losartan 50 milliGRAM(s) Oral at bedtime  rosuvastatin 40 milliGRAM(s) Oral at bedtime    MEDICATIONS  (PRN):  acetaminophen     Tablet .. 650 milliGRAM(s) Oral every 6 hours PRN Temp greater or equal to 38C (100.4F), Mild Pain (1 - 3)  albuterol    90 MICROgram(s) HFA Inhaler 2 Puff(s) Inhalation every 6 hours PRN Shortness of Breath and/or Wheezing  melatonin 3 milliGRAM(s) Oral at bedtime PRN Insomnia      CAPILLARY BLOOD GLUCOSE        I&O's Summary    12 Jan 2025 07:01  -  13 Jan 2025 07:00  --------------------------------------------------------  IN: 0 mL / OUT: 900 mL / NET: -900 mL    13 Jan 2025 07:01  -  13 Jan 2025 18:25  --------------------------------------------------------  IN: 1100 mL / OUT: 550 mL / NET: 550 mL        PHYSICAL EXAM:  Vital Signs Last 24 Hrs  T(C): 36.3 (13 Jan 2025 12:53), Max: 36.7 (13 Jan 2025 00:00)  T(F): 97.4 (13 Jan 2025 12:53), Max: 98.1 (13 Jan 2025 00:00)  HR: 70 (13 Jan 2025 17:30) (65 - 77)  BP: 98/61 (13 Jan 2025 17:30) (95/57 - 114/69)  BP(mean): --  RR: 18 (13 Jan 2025 17:30) (18 - 18)  SpO2: 96% (13 Jan 2025 17:30) (96% - 99%)    Parameters below as of 13 Jan 2025 17:30  Patient On (Oxygen Delivery Method): room air    CONSTITUTIONAL: No acute distress. Awake and alert.  RESPIRATORY: CTAB. No wheezes, rales, or rhonchi. No accessory muscle use. No apparent respiratory distress.  CARDIOVASCULAR: +S1/S2. No audible S3/S4. Regular rate and rhythm. No murmurs, rubs, or gallops. No LE swelling or edema.  GASTROINTESTINAL: Soft, nontender, nondistended. +BS. No rebound or guarding.   MUSCULOSKELETAL: Spontaneous movement in all extremities.  NEUROLOGICAL: CN 2-12 grossly intact. No focal deficits. Sensation intact x 4EXT.   PSYCHIATRIC: Appropriate affect. A&Ox3 (oriented to person, place, and time).      LABS:                        12.7   5.47  )-----------( 200      ( 13 Jan 2025 07:36 )             38.1     01-13    140  |  109[H]  |  32[H]  ----------------------------<  80  4.5   |  18[L]  |  1.62[H]    Ca    10.2      13 Jan 2025 07:36    TPro  6.3  /  Alb  3.4  /  TBili  0.4  /  DBili  x   /  AST  64[H]  /  ALT  38  /  AlkPhos  92  01-12          Urinalysis Basic - ( 13 Jan 2025 07:36 )    Color: x / Appearance: x / SG: x / pH: x  Gluc: 80 mg/dL / Ketone: x  / Bili: x / Urobili: x   Blood: x / Protein: x / Nitrite: x   Leuk Esterase: x / RBC: x / WBC x   Sq Epi: x / Non Sq Epi: x / Bacteria: x

## 2025-01-13 NOTE — PHYSICAL THERAPY INITIAL EVALUATION ADULT - ADDITIONAL COMMENTS
Patient lives alone on the 3rd floor of a 3-family house; Pt. has 3 flights of stairs to negotiate. PTA, pt. was independent in ambulation using RW, required one person assistance for stairs & ADLs. Pt. has 35 hrs x 7 days HHA support. Pt. reports her grandchildren or HHA assists her to negotiate stairs whenever required, she stops intermittently 2/2 SOB at baseline. Pt. has RW, WC & shower chair; denies any hx of fall in the past 6 months.

## 2025-01-13 NOTE — PROGRESS NOTE ADULT - PROBLEM SELECTOR PLAN 1
- Note elevated troponin (104 --> 63). Currently chest pain free  - A1c 5.9  - Lipid profile - HDL 32 (low), otherwise wnl  - Follows with outpatient Cardiologist Wendy Aiken (/[phone]) - discussed 1/12       - Catheterization 12/2023 demonstrating nonobstructive disease        - cMRI 1/2024 (also present in H&P) which shows increased thickness of the basal anteroseptal myocardium suspicious for HCM; no evidence for enhancement to suggest myocarditis, infiltrative disease or prior infarct; normal global and regional systolic left and right ventricular function with EF of 54% and 57%, respectively       - EKGs 12/2023 and 4/2024 demonstrating TWI in the lateral leads       - History of heavy PVC burden, treated temporarily with amiodarone  - Cards recs appreciated - f/u TTE, c/w DAPT and statin  - Monitor on telemetry

## 2025-01-13 NOTE — PHYSICAL THERAPY INITIAL EVALUATION ADULT - PLANNED THERAPY INTERVENTIONS, PT EVAL
GOAL: Patient will be able to negotiate 3 flights of steps w/one person assist/bed mobility training/gait training

## 2025-01-13 NOTE — PHYSICAL THERAPY INITIAL EVALUATION ADULT - ASSISTIVE DEVICE:SUPINE/SIT, REHAB EVAL
LM for pt at [phone] (H) vm, pt scheduled with AS for Thursday 2/21/19 double book for 8am.  Pt was asked to call back and confirm appt. bed rails

## 2025-01-13 NOTE — PHYSICAL THERAPY INITIAL EVALUATION ADULT - MD/RN NOTIFIED
Complex Repair Preamble Text (Leave Blank If You Do Not Want): Extensive wide undermining was performed. yes

## 2025-01-14 LAB
ANION GAP SERPL CALC-SCNC: 11 MMOL/L — SIGNIFICANT CHANGE UP (ref 5–17)
BUN SERPL-MCNC: 28 MG/DL — HIGH (ref 7–23)
CALCIUM SERPL-MCNC: 10.6 MG/DL — HIGH (ref 8.4–10.5)
CHLORIDE SERPL-SCNC: 109 MMOL/L — HIGH (ref 96–108)
CO2 SERPL-SCNC: 20 MMOL/L — LOW (ref 22–31)
CREAT SERPL-MCNC: 1.38 MG/DL — HIGH (ref 0.5–1.3)
EGFR: 38 ML/MIN/1.73M2 — LOW
GLUCOSE SERPL-MCNC: 85 MG/DL — SIGNIFICANT CHANGE UP (ref 70–99)
HCT VFR BLD CALC: 38.2 % — SIGNIFICANT CHANGE UP (ref 34.5–45)
HGB BLD-MCNC: 13 G/DL — SIGNIFICANT CHANGE UP (ref 11.5–15.5)
MCHC RBC-ENTMCNC: 31.9 PG — SIGNIFICANT CHANGE UP (ref 27–34)
MCHC RBC-ENTMCNC: 34 G/DL — SIGNIFICANT CHANGE UP (ref 32–36)
MCV RBC AUTO: 93.6 FL — SIGNIFICANT CHANGE UP (ref 80–100)
NRBC # BLD: 0 /100 WBCS — SIGNIFICANT CHANGE UP (ref 0–0)
PLATELET # BLD AUTO: 205 K/UL — SIGNIFICANT CHANGE UP (ref 150–400)
POTASSIUM SERPL-MCNC: 4.6 MMOL/L — SIGNIFICANT CHANGE UP (ref 3.5–5.3)
POTASSIUM SERPL-SCNC: 4.6 MMOL/L — SIGNIFICANT CHANGE UP (ref 3.5–5.3)
RBC # BLD: 4.08 M/UL — SIGNIFICANT CHANGE UP (ref 3.8–5.2)
RBC # FLD: 13.3 % — SIGNIFICANT CHANGE UP (ref 10.3–14.5)
SODIUM SERPL-SCNC: 140 MMOL/L — SIGNIFICANT CHANGE UP (ref 135–145)
WBC # BLD: 6.95 K/UL — SIGNIFICANT CHANGE UP (ref 3.8–10.5)
WBC # FLD AUTO: 6.95 K/UL — SIGNIFICANT CHANGE UP (ref 3.8–10.5)

## 2025-01-14 PROCEDURE — 93458 L HRT ARTERY/VENTRICLE ANGIO: CPT | Mod: 26,59

## 2025-01-14 PROCEDURE — 99232 SBSQ HOSP IP/OBS MODERATE 35: CPT

## 2025-01-14 PROCEDURE — 99152 MOD SED SAME PHYS/QHP 5/>YRS: CPT

## 2025-01-14 RX ORDER — SENNOSIDES 8.6 MG/1
1 TABLET, FILM COATED ORAL ONCE
Refills: 0 | Status: COMPLETED | OUTPATIENT
Start: 2025-01-14 | End: 2025-01-14

## 2025-01-14 RX ORDER — SODIUM CHLORIDE 9 MG/ML
250 INJECTION, SOLUTION INTRAMUSCULAR; INTRAVENOUS; SUBCUTANEOUS ONCE
Refills: 0 | Status: DISCONTINUED | OUTPATIENT
Start: 2025-01-14 | End: 2025-01-15

## 2025-01-14 RX ORDER — POLYETHYLENE GLYCOL 3350 17 G/DOSE
17 POWDER (GRAM) ORAL ONCE
Refills: 0 | Status: COMPLETED | OUTPATIENT
Start: 2025-01-14 | End: 2025-01-14

## 2025-01-14 RX ORDER — SODIUM CHLORIDE 9 MG/ML
1000 INJECTION, SOLUTION INTRAMUSCULAR; INTRAVENOUS; SUBCUTANEOUS
Refills: 0 | Status: DISCONTINUED | OUTPATIENT
Start: 2025-01-14 | End: 2025-01-15

## 2025-01-14 RX ADMIN — HEPARIN SODIUM 5000 UNIT(S): 1000 INJECTION, SOLUTION INTRAVENOUS; SUBCUTANEOUS at 17:31

## 2025-01-14 RX ADMIN — CARVEDILOL 12.5 MILLIGRAM(S): 25 TABLET, FILM COATED ORAL at 17:31

## 2025-01-14 RX ADMIN — LOSARTAN POTASSIUM 50 MILLIGRAM(S): 100 TABLET, FILM COATED ORAL at 21:35

## 2025-01-14 RX ADMIN — AMIODARONE HYDROCHLORIDE 200 MILLIGRAM(S): 200 TABLET ORAL at 06:19

## 2025-01-14 RX ADMIN — EZETIMIBE 10 MILLIGRAM(S): 10 TABLET ORAL at 13:59

## 2025-01-14 RX ADMIN — CLOPIDOGREL BISULFATE 75 MILLIGRAM(S): 75 TABLET, FILM COATED ORAL at 13:59

## 2025-01-14 RX ADMIN — ROSUVASTATIN 40 MILLIGRAM(S): 40 TABLET, FILM COATED ORAL at 21:35

## 2025-01-14 RX ADMIN — SENNOSIDES 1 TABLET(S): 8.6 TABLET, FILM COATED ORAL at 21:35

## 2025-01-14 RX ADMIN — Medication 17 GRAM(S): at 21:35

## 2025-01-14 RX ADMIN — Medication 81 MILLIGRAM(S): at 13:59

## 2025-01-14 RX ADMIN — HEPARIN SODIUM 5000 UNIT(S): 1000 INJECTION, SOLUTION INTRAVENOUS; SUBCUTANEOUS at 06:18

## 2025-01-14 NOTE — PROGRESS NOTE ADULT - SUBJECTIVE AND OBJECTIVE BOX
INTERVAL EVENTS/SUBJ:  No events     Home Medications:  amiodarone 200 mg oral tablet: 1 tab(s) orally once a day (11 Jan 2025 01:36)  aspirin 81 mg oral tablet: orally once a day (11 Jan 2025 01:36)  carvedilol 12.5 mg oral tablet: 1 tab(s) orally every 12 hours (11 Jan 2025 01:36)  ezetimibe 10 mg oral tablet: 1 tab(s) orally once a day (11 Jan 2025 01:36)  Jardiance 10 mg oral tablet: 1 tab(s) orally once a day (at bedtime) (11 Jan 2025 01:36)  losartan 50 mg oral tablet: 1 tab(s) orally once a day (at bedtime) (11 Jan 2025 01:36)  rosuvastatin 40 mg oral tablet: 1 tab(s) orally once a day (at bedtime) (11 Jan 2025 01:36)  torsemide 10 mg oral tablet: 1 tab(s) orally once a day (11 Jan 2025 01:36)  Vascepa 1 g oral capsule: 2 cap(s) orally 2 times a day (11 Jan 2025 01:36)      MEDICATIONS  (STANDING):  aMIOdarone    Tablet 200 milliGRAM(s) Oral daily  aspirin enteric coated 81 milliGRAM(s) Oral daily  carvedilol 12.5 milliGRAM(s) Oral every 12 hours  clopidogrel Tablet 75 milliGRAM(s) Oral daily  ezetimibe 10 milliGRAM(s) Oral daily  heparin   Injectable 5000 Unit(s) SubCutaneous every 12 hours  losartan 50 milliGRAM(s) Oral at bedtime  rosuvastatin 40 milliGRAM(s) Oral at bedtime    MEDICATIONS  (PRN):  acetaminophen     Tablet .. 650 milliGRAM(s) Oral every 6 hours PRN Temp greater or equal to 38C (100.4F), Mild Pain (1 - 3)  albuterol    90 MICROgram(s) HFA Inhaler 2 Puff(s) Inhalation every 6 hours PRN Shortness of Breath and/or Wheezing  melatonin 3 milliGRAM(s) Oral at bedtime PRN Insomnia      Vital Signs Last 24 Hrs  T(C): 37.2 (14 Jan 2025 05:35), Max: 37.2 (14 Jan 2025 05:35)  T(F): 99 (14 Jan 2025 05:35), Max: 99 (14 Jan 2025 05:35)  HR: 71 (14 Jan 2025 05:35) (65 - 71)  BP: 96/57 (14 Jan 2025 05:35) (96/57 - 122/74)  BP(mean): --  RR: 18 (14 Jan 2025 05:35) (18 - 18)  SpO2: 95% (14 Jan 2025 05:35) (95% - 98%)    Parameters below as of 14 Jan 2025 05:35  Patient On (Oxygen Delivery Method): room air        REVIEW OF SYSTEMS:  As per HPI, otherwise unremarkable.     PHYSICAL EXAM:  Constitutional/Appearance: Normal, Well-developed  HEENT:   Normal oral mucosa, no drainage or redness, supple neck  Lymphatic: No lymphadenopathy  Cardiovascular: Normal S1 S2, No edema, II/VI DIMITRI  Respiratory: Lungs clear to auscultation, respirations non-labored  Psychiatry: A & O x 3, appropriate affect.   Gastrointestinal:  Soft, Non-tender, no distention  Skin: No rashes, No ecchymoses, No cyanosis	  Neurologic: Non-focal, Alert and oriented x 3  Extremities: Normal range of motion  Vascular: Peripheral pulses palpable 2+ bilaterally (radial)    LABS:  CBC Full  -  ( 14 Jan 2025 07:26 )  WBC Count : 6.95 K/uL  RBC Count : 4.08 M/uL  Hemoglobin : 13.0 g/dL  Hematocrit : 38.2 %  Platelet Count - Automated : 205 K/uL  Mean Cell Volume : 93.6 fl  Mean Cell Hemoglobin : 31.9 pg  Mean Cell Hemoglobin Concentration : 34.0 g/dL  Auto Neutrophil # : x  Auto Lymphocyte # : x  Auto Monocyte # : x  Auto Eosinophil # : x  Auto Basophil # : x  Auto Neutrophil % : x  Auto Lymphocyte % : x  Auto Monocyte % : x  Auto Eosinophil % : x  Auto Basophil % : x      01-14    140  |  109[H]  |  28[H]  ----------------------------<  85  4.6   |  20[L]  |  1.38[H]    Ca    10.6[H]      14 Jan 2025 07:21    IMPRESSION AND PLAN: 84F w HTN, HL, ?HCM on CMR, CVA, dementia, ?CAD here for elevated trop with abdominal pain. CP now resolved  -tele  -trop 104 - 63  -[facility] 12/2023 non-obs - rpt [facility] pending  -CMR 1/2024 w possible HCM  -TTE EF 45% w rwma  -cont asa, plavix  -cont losartan        35 minutes were spent on this encounter for extensive review of medical record details including labs and/or imaging studies and/or adjacent care team and consultant records, as well as review and reconciliation of current medications. Time was spent on obtaining a history, performing physical examination of patient, and answering patient and/or family questions regarding plan of care. Time was also spent discussing plan of care with patient’s other care team members including primary and consulting teams. Time also was spent on documentation of this encounter into the EHR.    ***    Amari Richter MD, MPhil, [facility]  Cardiologist, [facility]  ; [employer] at [facility]  email: [email or web address] Cardiovascular Surgery on-service contact/call information, go to [email or web address] and use [email or web address] to login.  Outpatient Cardiology appointments, call  [phone] to arrange with a colleague; I do not have outpatient Cardiology clinic.

## 2025-01-14 NOTE — PROGRESS NOTE ADULT - PROBLEM SELECTOR PROBLEM 2
DAPHNEY (acute kidney injury)

## 2025-01-14 NOTE — PROGRESS NOTE ADULT - ASSESSMENT
84FPMHx HTN, dementia, GERD on omeprazole, anemia, hyperlipidemia, referred to ED for concern of abnormal EKG found to have NSTEMI.
84FPMHx HTN, dementia, GERD on omeprazole, anemia, hyperlipidemia, referred to ED for concern of abnormal EKG found to have NSTEMI. S/p diagnostic LHC
84FPMHx HTN, dementia, GERD on omeprazole, anemia, hyperlipidemia, referred to ED for concern of abnormal EKG found to have NSTEMI.
84FPMHx HTN, dementia, GERD on omeprazole, anemia, hyperlipidemia, referred to ED for concern of abnormal EKG found to have NSTEMI.

## 2025-01-14 NOTE — PROGRESS NOTE ADULT - SUBJECTIVE AND OBJECTIVE BOX
Snehal Grubbs PA-C  [facility] Division of Hospital Medicine  Available via MS Teams  Spectra [phone]    SUBJECTIVE / OVERNIGHT EVENTS: 84 year old Maltese-speaking female presents today for [facility].    ADDITIONAL REVIEW OF SYSTEMS:    MEDICATIONS  (STANDING):  aMIOdarone    Tablet 200 milliGRAM(s) Oral daily  aspirin enteric coated 81 milliGRAM(s) Oral daily  carvedilol 12.5 milliGRAM(s) Oral every 12 hours  clopidogrel Tablet 75 milliGRAM(s) Oral daily  ezetimibe 10 milliGRAM(s) Oral daily  heparin   Injectable 5000 Unit(s) SubCutaneous every 12 hours  losartan 50 milliGRAM(s) Oral at bedtime  rosuvastatin 40 milliGRAM(s) Oral at bedtime  sodium chloride 0.9% Bolus 250 milliLiter(s) IV Bolus once  sodium chloride 0.9%. 1000 milliLiter(s) (50 mL/Hr) IV Continuous <Continuous>    MEDICATIONS  (PRN):  acetaminophen     Tablet .. 650 milliGRAM(s) Oral every 6 hours PRN Temp greater or equal to 38C (100.4F), Mild Pain (1 - 3)  albuterol    90 MICROgram(s) HFA Inhaler 2 Puff(s) Inhalation every 6 hours PRN Shortness of Breath and/or Wheezing  melatonin 3 milliGRAM(s) Oral at bedtime PRN Insomnia      I&O's Summary    13 Jan 2025 07:01  -  14 Jan 2025 07:00  --------------------------------------------------------  IN: 1100 mL / OUT: 550 mL / NET: 550 mL        PHYSICAL EXAM:  Vital Signs Last 24 Hrs  T(C): 36.7 (14 Jan 2025 17:10), Max: 37.2 (14 Jan 2025 05:35)  T(F): 98.1 (14 Jan 2025 17:10), Max: 99 (14 Jan 2025 05:35)  HR: 74 (14 Jan 2025 17:10) (64 - 74)  BP: 141/75 (14 Jan 2025 17:10) (96/57 - 152/75)  BP(mean): --  RR: 18 (14 Jan 2025 17:10) (16 - 18)  SpO2: 97% (14 Jan 2025 17:10) (95% - 99%)    Parameters below as of 14 Jan 2025 17:10  Patient On (Oxygen Delivery Method): room air      CONSTITUTIONAL: NAD  RESPIRATORY: Normal respiratory effort; lungs are clear to auscultation bilaterally with no wheezes, rales, or rhonchi. No accessory muscle use. No apparent respiratory distress. On Room Air  CARDIOVASCULAR: +S1/S2. No audible S3/S4. Regular rate and rhythm. No murmurs, rubs, or gallops. No LE swelling or edema.  ABDOMEN: Nontender to light and deep palpation; Normoactive bowel sounds; No rebound/guarding  MUSCULOSKELETAL: FARROM, no joint swelling or tenderness to palpation  PSYCH: AO x4 to person, place, time and event; affect appropriate      LABS:                        13.0   6.95  )-----------( 205      ( 14 Jan 2025 07:26 )             38.2     01-14    140  |  109[H]  |  28[H]  ----------------------------<  85  4.6   |  20[L]  |  1.38[H]    Ca    10.6[H]      14 Jan 2025 07:21            Urinalysis Basic - ( 14 Jan 2025 07:21 )    Color: x / Appearance: x / SG: x / pH: x  Gluc: 85 mg/dL / Ketone: x  / Bili: x / Urobili: x   Blood: x / Protein: x / Nitrite: x   Leuk Esterase: x / RBC: x / WBC x   Sq Epi: x / Non Sq Epi: x / Bacteria: x            RADIOLOGY & ADDITIONAL TESTS:  New Results Reviewed Today: s/p LHC, diagnostic  New Imaging Personally Reviewed Today:  New Electrocardiogram Personally Reviewed Today:  Prior or Outpatient Records Reviewed Today:    COMMUNICATION:  Care Discussed with Consultants/Other Providers and Details of Discussion: Dr. Ya  Discussions with Patient/Family: Patient, Daughter Natalie Dailey (acting as ) and Son present at bedside

## 2025-01-14 NOTE — PROGRESS NOTE ADULT - PROBLEM SELECTOR PLAN 1
- Note elevated troponin (104 --> 63). Currently chest pain free  - A1c 5.9  - Lipid profile - HDL 32 (low), otherwise wnl  - Follows with outpatient Cardiologist Wendy Aiken (/[phone]) - discussed 1/12       - Catheterization 12/2023 demonstrating nonobstructive disease        - cMRI 1/2024 (also present in H&P) which shows increased thickness of the basal anteroseptal myocardium suspicious for HCM; no evidence for enhancement to suggest myocarditis, infiltrative disease or prior infarct; normal global and regional systolic left and right ventricular function with EF of 54% and 57%, respectively       - EKGs 12/2023 and 4/2024 demonstrating TWI in the lateral leads       - History of heavy PVC burden, treated temporarily with amiodarone  - Cards recs appreciated, c/w DAPT and statin  - Monitor on telemetry  - TTE 1/13 Left ventricular systolic function is mildly decreased. Regional wall motion abnormalities consistent with ischemic heart disease.  - s/p Cardiac cath via RFA. Diagnostic cath, Nonobstructive CAD - Note elevated troponin (104 --> 63). Currently chest pain free  - A1c 5.9  - Lipid profile - HDL 32 (low), otherwise wnl  - Follows with outpatient Cardiologist Wendy Aiken (/[phone]) - discussed 1/12       - Catheterization 12/2023 demonstrating nonobstructive disease        - cMRI 1/2024 (also present in H&P) which shows increased thickness of the basal anteroseptal myocardium suspicious for HCM; no evidence for enhancement to suggest myocarditis, infiltrative disease or prior infarct; normal global and regional systolic left and right ventricular function with EF of 54% and 57%, respectively       - EKGs 12/2023 and 4/2024 demonstrating TWI in the lateral leads       - History of heavy PVC burden, treated temporarily with amiodarone  - Cards recs appreciated, c/w DAPT and statin  - Monitor on telemetry  - TTE 1/13 Left ventricular systolic function is mildly decreased. Regional wall motion abnormalities consistent with ischemic heart disease.  - s/p Cardiac cath via RFA. Diagnostic cath, Nonobstructive CAD  - outpatient management for CAD: continue DAPT? or ASA monotherapy?

## 2025-01-14 NOTE — PROGRESS NOTE ADULT - PROBLEM SELECTOR PLAN 3
- BNP 60123  - Currently euvolemic  - Cards recs appreciated   - TTE 1/13 Left ventricular systolic function is mildly decreased. Regional wall motion abnormalities consistent with ischemic heart disease.  - Holding Torsemide for now  - C/w amiodarone  - C/w carvedilol and losartan  - Monitor fluid status

## 2025-01-14 NOTE — CHART NOTE - NSCHARTNOTEFT_GEN_A_CORE
Patient is seen and evaluated at bedside, is s/p LHC via RFA access. Patient denies pain, active chest pain, fevers, chills, N/V, CP or SOB, abdominal pain, headache, dizziness, or chills.  Site is clean, dry, and intact. No signs of bleeding, redness, warmth, or swelling/hematoma noted.    Physical Exam  General: Older female, resting comfortably in bed, NAD  CV: +S1, S2, Regular Rate and Rhythm  Pulm: Clear to auscultation bilateral lung fields  GI: +BS in all 4 quadrants, abdomen soft, non-tender to light and deep palpation, non-distended  Ext: 2+ radial pulses bilateral upper extremities, 2+ femoral pulses bilateral lower extremities; cath site clean, dry and intact, absent active bleeding. no pain noted to cath site upon light palpation, no evidence of hematoma.    Plan:  - Physical Exam as above  - Monitor pulses and site checks; monitor for continued/worsened pain  - Endorsed to RN  - Will continue to monitor every 6 hours.   - Will endorse to PM team    Snehal Grubbs PA-C  Department of Medicine  Spectralink #[phone]

## 2025-01-15 ENCOUNTER — TRANSCRIPTION ENCOUNTER (OUTPATIENT)
Age: 85
End: 2025-01-15

## 2025-01-15 VITALS
OXYGEN SATURATION: 98 % | HEART RATE: 70 BPM | DIASTOLIC BLOOD PRESSURE: 74 MMHG | SYSTOLIC BLOOD PRESSURE: 116 MMHG | RESPIRATION RATE: 18 BRPM | TEMPERATURE: 97 F

## 2025-01-15 LAB
ANION GAP SERPL CALC-SCNC: 12 MMOL/L — SIGNIFICANT CHANGE UP (ref 5–17)
BUN SERPL-MCNC: 26 MG/DL — HIGH (ref 7–23)
CALCIUM SERPL-MCNC: 10.3 MG/DL — SIGNIFICANT CHANGE UP (ref 8.4–10.5)
CHLORIDE SERPL-SCNC: 108 MMOL/L — SIGNIFICANT CHANGE UP (ref 96–108)
CO2 SERPL-SCNC: 17 MMOL/L — LOW (ref 22–31)
CREAT SERPL-MCNC: 1.42 MG/DL — HIGH (ref 0.5–1.3)
EGFR: 36 ML/MIN/1.73M2 — LOW
GLUCOSE SERPL-MCNC: 89 MG/DL — SIGNIFICANT CHANGE UP (ref 70–99)
HCT VFR BLD CALC: 39.2 % — SIGNIFICANT CHANGE UP (ref 34.5–45)
HGB BLD-MCNC: 12.8 G/DL — SIGNIFICANT CHANGE UP (ref 11.5–15.5)
MCHC RBC-ENTMCNC: 32.2 PG — SIGNIFICANT CHANGE UP (ref 27–34)
MCHC RBC-ENTMCNC: 32.7 G/DL — SIGNIFICANT CHANGE UP (ref 32–36)
MCV RBC AUTO: 98.5 FL — SIGNIFICANT CHANGE UP (ref 80–100)
NRBC # BLD: 0 /100 WBCS — SIGNIFICANT CHANGE UP (ref 0–0)
PLATELET # BLD AUTO: 188 K/UL — SIGNIFICANT CHANGE UP (ref 150–400)
POTASSIUM SERPL-MCNC: 4.7 MMOL/L — SIGNIFICANT CHANGE UP (ref 3.5–5.3)
POTASSIUM SERPL-SCNC: 4.7 MMOL/L — SIGNIFICANT CHANGE UP (ref 3.5–5.3)
RBC # BLD: 3.98 M/UL — SIGNIFICANT CHANGE UP (ref 3.8–5.2)
RBC # FLD: 13.5 % — SIGNIFICANT CHANGE UP (ref 10.3–14.5)
SODIUM SERPL-SCNC: 137 MMOL/L — SIGNIFICANT CHANGE UP (ref 135–145)
WBC # BLD: 7.13 K/UL — SIGNIFICANT CHANGE UP (ref 3.8–10.5)
WBC # FLD AUTO: 7.13 K/UL — SIGNIFICANT CHANGE UP (ref 3.8–10.5)

## 2025-01-15 PROCEDURE — 85027 COMPLETE CBC AUTOMATED: CPT

## 2025-01-15 PROCEDURE — 86901 BLOOD TYPING SEROLOGIC RH(D): CPT

## 2025-01-15 PROCEDURE — 97161 PT EVAL LOW COMPLEX 20 MIN: CPT

## 2025-01-15 PROCEDURE — 80053 COMPREHEN METABOLIC PANEL: CPT

## 2025-01-15 PROCEDURE — 82550 ASSAY OF CK (CPK): CPT

## 2025-01-15 PROCEDURE — 99232 SBSQ HOSP IP/OBS MODERATE 35: CPT

## 2025-01-15 PROCEDURE — 99285 EMERGENCY DEPT VISIT HI MDM: CPT

## 2025-01-15 PROCEDURE — 84300 ASSAY OF URINE SODIUM: CPT

## 2025-01-15 PROCEDURE — 83735 ASSAY OF MAGNESIUM: CPT

## 2025-01-15 PROCEDURE — 82553 CREATINE MB FRACTION: CPT

## 2025-01-15 PROCEDURE — 71045 X-RAY EXAM CHEST 1 VIEW: CPT

## 2025-01-15 PROCEDURE — 86850 RBC ANTIBODY SCREEN: CPT

## 2025-01-15 PROCEDURE — 84443 ASSAY THYROID STIM HORMONE: CPT

## 2025-01-15 PROCEDURE — 99239 HOSP IP/OBS DSCHRG MGMT >30: CPT

## 2025-01-15 PROCEDURE — C1894: CPT

## 2025-01-15 PROCEDURE — 83935 ASSAY OF URINE OSMOLALITY: CPT

## 2025-01-15 PROCEDURE — 85730 THROMBOPLASTIN TIME PARTIAL: CPT

## 2025-01-15 PROCEDURE — 80048 BASIC METABOLIC PNL TOTAL CA: CPT

## 2025-01-15 PROCEDURE — 82570 ASSAY OF URINE CREATININE: CPT

## 2025-01-15 PROCEDURE — 94640 AIRWAY INHALATION TREATMENT: CPT

## 2025-01-15 PROCEDURE — 85025 COMPLETE CBC W/AUTO DIFF WBC: CPT

## 2025-01-15 PROCEDURE — 84484 ASSAY OF TROPONIN QUANT: CPT

## 2025-01-15 PROCEDURE — 80061 LIPID PANEL: CPT

## 2025-01-15 PROCEDURE — 86900 BLOOD TYPING SEROLOGIC ABO: CPT

## 2025-01-15 PROCEDURE — C1769: CPT

## 2025-01-15 PROCEDURE — C1887: CPT

## 2025-01-15 PROCEDURE — 93455 CORONARY ART/GRFT ANGIO S&I: CPT

## 2025-01-15 PROCEDURE — C8929: CPT

## 2025-01-15 PROCEDURE — 83880 ASSAY OF NATRIURETIC PEPTIDE: CPT

## 2025-01-15 PROCEDURE — 83036 HEMOGLOBIN GLYCOSYLATED A1C: CPT

## 2025-01-15 PROCEDURE — 85610 PROTHROMBIN TIME: CPT

## 2025-01-15 RX ORDER — AMIODARONE HYDROCHLORIDE 200 MG/1
1 TABLET ORAL
Qty: 0 | Refills: 0 | DISCHARGE
Start: 2025-01-15

## 2025-01-15 RX ORDER — LOSARTAN POTASSIUM 100 MG/1
1 TABLET, FILM COATED ORAL
Qty: 0 | Refills: 0 | DISCHARGE
Start: 2025-01-15

## 2025-01-15 RX ORDER — LOSARTAN POTASSIUM 100 MG/1
1 TABLET, FILM COATED ORAL
Refills: 0 | DISCHARGE

## 2025-01-15 RX ORDER — CARVEDILOL 25 MG/1
1 TABLET, FILM COATED ORAL
Qty: 0 | Refills: 0 | DISCHARGE
Start: 2025-01-15

## 2025-01-15 RX ORDER — AMIODARONE HYDROCHLORIDE 200 MG/1
1 TABLET ORAL
Refills: 0 | DISCHARGE

## 2025-01-15 RX ORDER — TORSEMIDE 10 MG/1
1 TABLET ORAL
Refills: 0 | DISCHARGE

## 2025-01-15 RX ORDER — CLOPIDOGREL BISULFATE 75 MG/1
1 TABLET, FILM COATED ORAL
Qty: 90 | Refills: 0
Start: 2025-01-15 | End: 2025-04-14

## 2025-01-15 RX ORDER — CARVEDILOL 25 MG/1
1 TABLET, FILM COATED ORAL
Refills: 0 | DISCHARGE

## 2025-01-15 RX ADMIN — CLOPIDOGREL BISULFATE 75 MILLIGRAM(S): 75 TABLET, FILM COATED ORAL at 11:58

## 2025-01-15 RX ADMIN — Medication 81 MILLIGRAM(S): at 11:57

## 2025-01-15 RX ADMIN — HEPARIN SODIUM 5000 UNIT(S): 1000 INJECTION, SOLUTION INTRAVENOUS; SUBCUTANEOUS at 05:24

## 2025-01-15 RX ADMIN — AMIODARONE HYDROCHLORIDE 200 MILLIGRAM(S): 200 TABLET ORAL at 05:24

## 2025-01-15 RX ADMIN — EZETIMIBE 10 MILLIGRAM(S): 10 TABLET ORAL at 11:58

## 2025-01-15 NOTE — DISCHARGE NOTE PROVIDER - HOSPITAL COURSE
HPI:  84F w/ hx of HTN, dementia, GERD on omeprazole,  anemia, hyperlipidemia, referred to ED by her hematologist?  for concern of abnormal EKG.  Patient was brought to see her doctor today due to having 2 to 3 weeks of persistent malaise, worsening shortness of breath/ SINCLAIR, and intermittent epigastric pain. Cannot walk up 3 flights of stairs to her apartment without pausing. Sent to hospital with abnormal EKG. Denies any recent fevers, sore throat, cough. Patient's daughter showed ER provider at [place] report of cardiac MRI performed on January 26, 2024 which shows increased thickness of the basal anteroseptal myocardium suspicious for HCM.  No evidence for enhancement to suggest myocarditis, infiltrative disease or prior infarct.  There is normal global and regional systolic left and right ventricular function with EF of 54% and 57%, respectively.  Large hiatal hernia with a portion of the stomach within thoracic cavity. In [place] patient was found to have NSTEMI and transferred to [facility] for NSTEMI. May have had angiogram in past but not clear.    In ER: Given ASA 324mg, Plavix 300mg, Heparin gtt, Maalox, pepcid 20mg IV (11 Jan 2025 01:18)    Hospital Course:      Patient is medically cleared for discharge. Medication reconciliation reviewed, revised, and resolved with Dr. Ya who had medically cleared patient for discharge with follow-up as advised. Patient is currently stable for discharge to home with home care at this time.    Important Medication Changes and Reason:  Please see medication reconciliation for any medication changes    Active or Pending Issues Requiring Follow-up:  - PCP  - Cardiology    Advanced Directives:   [x] Full code  [ ] DNR  [ ] Hospice    Discharge Diagnoses:         HPI:  84F w/ hx of HTN, dementia, GERD on omeprazole,  anemia, hyperlipidemia, referred to ED by her hematologist?  for concern of abnormal EKG.  Patient was brought to see her doctor today due to having 2 to 3 weeks of persistent malaise, worsening shortness of breath/ SINCLAIR, and intermittent epigastric pain. Cannot walk up 3 flights of stairs to her apartment without pausing. Sent to hospital with abnormal EKG. Denies any recent fevers, sore throat, cough. Patient's daughter showed ER provider at [place] report of cardiac MRI performed on January 26, 2024 which shows increased thickness of the basal anteroseptal myocardium suspicious for HCM.  No evidence for enhancement to suggest myocarditis, infiltrative disease or prior infarct.  There is normal global and regional systolic left and right ventricular function with EF of 54% and 57%, respectively.  Large hiatal hernia with a portion of the stomach within thoracic cavity. In [place] patient was found to have NSTEMI and transferred to [facility] for NSTEMI. May have had angiogram in past but not clear.    In ER: Given ASA 324mg, Plavix 300mg, Heparin gtt, Maalox, pepcid 20mg IV (11 Jan 2025 01:18)    Hospital Course:  Patient was admitted for NSTEMI. Troponins elevated and downtrended. Per Cardiology, started plavix and reviewed cath report 12/23 and cMRI from 1/24. Repeat echo showed mildly decreased left ventricular systolic function and regional wall motion abnormalities consistent with ischemic heart disease. s/p diagnostic cardiac cath, nonobstructive coronary artery disease on 1/14/25 . Can follow up with outpatient cardiologist.    Patient is medically cleared for discharge. Medication reconciliation reviewed, revised, and resolved with Dr. Ya who had medically cleared patient for discharge with follow-up as advised. Patient is currently stable for discharge to home with home care at this time.    Important Medication Changes and Reason:  Please see medication reconciliation for any medication changes    Active or Pending Issues Requiring Follow-up:  - PCP  - Cardiology Dr. Wendy Cole    Advanced Directives:   [x] Full code  [ ] DNR  [ ] Hospice    Discharge Diagnoses:  NSTEMI  DAPHNEY  CHF  HTN  HLD         HPI:  84F w/ hx of HTN, dementia, GERD on omeprazole,  anemia, hyperlipidemia, referred to ED by her hematologist?  for concern of abnormal EKG.  Patient was brought to see her doctor today due to having 2 to 3 weeks of persistent malaise, worsening shortness of breath/ SINCLAIR, and intermittent epigastric pain. Cannot walk up 3 flights of stairs to her apartment without pausing. Sent to hospital with abnormal EKG. Denies any recent fevers, sore throat, cough. Patient's daughter showed ER provider at [place] report of cardiac MRI performed on January 26, 2024 which shows increased thickness of the basal anteroseptal myocardium suspicious for HCM.  No evidence for enhancement to suggest myocarditis, infiltrative disease or prior infarct.  There is normal global and regional systolic left and right ventricular function with EF of 54% and 57%, respectively.  Large hiatal hernia with a portion of the stomach within thoracic cavity. In [place] patient was found to have NSTEMI and transferred to [facility] for NSTEMI. May have had angiogram in past but not clear.    In ER: Given ASA 324mg, Plavix 300mg, Heparin gtt, Maalox, pepcid 20mg IV (11 Jan 2025 01:18)    Hospital Course:  Patient was admitted for NSTEMI. Troponins elevated and downtrended. Per Cardiology, started plavix and reviewed cath report 12/23 and cMRI from 1/24. Repeat echo showed mildly decreased left ventricular systolic function and regional wall motion abnormalities consistent with ischemic heart disease. s/p diagnostic cardiac cath, nonobstructive coronary artery disease on 1/14/25 . Can follow up with outpatient cardiologist. Discontinued torsemide as patient with DAPHNEY this admission, appears euvolemic on exam. Can be resumed by cardiologist.    Patient is medically cleared for discharge. Medication reconciliation reviewed, revised, and resolved with Dr. Ya who had medically cleared patient for discharge with follow-up as advised. Patient is currently stable for discharge to home with home care at this time.    Important Medication Changes and Reason:  Please see medication reconciliation for any medication changes    Active or Pending Issues Requiring Follow-up:  - PCP  - Cardiology Dr. Wendy Cole    Advanced Directives:   [x] Full code  [ ] DNR  [ ] Hospice    Discharge Diagnoses:  NSTEMI  DAPHNEY  CHF  HTN  HLD

## 2025-01-15 NOTE — DISCHARGE NOTE PROVIDER - NSDCMRMEDTOKEN_GEN_ALL_CORE_FT
amiodarone 200 mg oral tablet: 1 tab(s) orally once a day  aspirin 81 mg oral tablet: orally once a day  carvedilol 12.5 mg oral tablet: 1 tab(s) orally every 12 hours  ezetimibe 10 mg oral tablet: 1 tab(s) orally once a day  Jardiance 10 mg oral tablet: 1 tab(s) orally once a day (at bedtime)  losartan 50 mg oral tablet: 1 tab(s) orally once a day (at bedtime)  rosuvastatin 40 mg oral tablet: 1 tab(s) orally once a day (at bedtime)  torsemide 10 mg oral tablet: 1 tab(s) orally once a day  Vascepa 1 g oral capsule: 2 cap(s) orally 2 times a day   amiodarone 200 mg oral tablet: 1 tab(s) orally once a day  aspirin 81 mg oral tablet: orally once a day  carvedilol 12.5 mg oral tablet: 1 tab(s) orally every 12 hours  clopidogrel 75 mg oral tablet: 1 tab(s) orally once a day  ezetimibe 10 mg oral tablet: 1 tab(s) orally once a day  Jardiance 10 mg oral tablet: 1 tab(s) orally once a day (at bedtime)  losartan 50 mg oral tablet: 1 tab(s) orally once a day (at bedtime)  rosuvastatin 40 mg oral tablet: 1 tab(s) orally once a day (at bedtime)  Vascepa 1 g oral capsule: 2 cap(s) orally 2 times a day

## 2025-01-15 NOTE — DISCHARGE NOTE PROVIDER - PROVIDER TOKENS
FREE:[LAST:[Kelsey],FIRST:[Wendy],PHONE:[(   )    -],FAX:[(   )    -]],PROVIDER:[TOKEN:[[id number]],FOLLOWUP:[1 week]]

## 2025-01-15 NOTE — PROGRESS NOTE ADULT - SUBJECTIVE AND OBJECTIVE BOX
INTERVAL EVENTS/SUBJ:  s/p [facility] non-obs    Home Medications:  amiodarone 200 mg oral tablet: 1 tab(s) orally once a day (11 Jan 2025 01:36)  aspirin 81 mg oral tablet: orally once a day (11 Jan 2025 01:36)  carvedilol 12.5 mg oral tablet: 1 tab(s) orally every 12 hours (11 Jan 2025 01:36)  ezetimibe 10 mg oral tablet: 1 tab(s) orally once a day (11 Jan 2025 01:36)  Jardiance 10 mg oral tablet: 1 tab(s) orally once a day (at bedtime) (11 Jan 2025 01:36)  losartan 50 mg oral tablet: 1 tab(s) orally once a day (at bedtime) (11 Jan 2025 01:36)  rosuvastatin 40 mg oral tablet: 1 tab(s) orally once a day (at bedtime) (11 Jan 2025 01:36)  torsemide 10 mg oral tablet: 1 tab(s) orally once a day (11 Jan 2025 01:36)  Vascepa 1 g oral capsule: 2 cap(s) orally 2 times a day (11 Jan 2025 01:36)      MEDICATIONS  (STANDING):  aMIOdarone    Tablet 200 milliGRAM(s) Oral daily  aspirin enteric coated 81 milliGRAM(s) Oral daily  carvedilol 12.5 milliGRAM(s) Oral every 12 hours  clopidogrel Tablet 75 milliGRAM(s) Oral daily  ezetimibe 10 milliGRAM(s) Oral daily  heparin   Injectable 5000 Unit(s) SubCutaneous every 12 hours  losartan 50 milliGRAM(s) Oral at bedtime  rosuvastatin 40 milliGRAM(s) Oral at bedtime  sodium chloride 0.9% Bolus 250 milliLiter(s) IV Bolus once  sodium chloride 0.9%. 1000 milliLiter(s) (50 mL/Hr) IV Continuous <Continuous>    MEDICATIONS  (PRN):  acetaminophen     Tablet .. 650 milliGRAM(s) Oral every 6 hours PRN Temp greater or equal to 38C (100.4F), Mild Pain (1 - 3)  albuterol    90 MICROgram(s) HFA Inhaler 2 Puff(s) Inhalation every 6 hours PRN Shortness of Breath and/or Wheezing  melatonin 3 milliGRAM(s) Oral at bedtime PRN Insomnia      Vital Signs Last 24 Hrs  T(C): 36.9 (15 Rod 2025 05:18), Max: 36.9 (14 Jan 2025 13:55)  T(F): 98.4 (15 Rod 2025 05:18), Max: 98.4 (14 Jan 2025 13:55)  HR: 70 (15 Rod 2025 05:18) (64 - 74)  BP: 90/57 (15 Rod 2025 05:18) (90/57 - 152/75)  BP(mean): --  RR: 18 (15 Rod 2025 05:18) (16 - 18)  SpO2: 96% (15 Rod 2025 05:18) (95% - 99%)    Parameters below as of 15 Rod 2025 05:18  Patient On (Oxygen Delivery Method): room air        REVIEW OF SYSTEMS:  As per HPI, otherwise unremarkable.     PHYSICAL EXAM:  Constitutional/Appearance: Normal, Well-developed  HEENT:   Normal oral mucosa, no drainage or redness, supple neck  Lymphatic: No lymphadenopathy  Cardiovascular: Normal S1 S2, No edema, II/VI DIMITRI  Respiratory: Lungs clear to auscultation, respirations non-labored  Psychiatry: A & O x 3, appropriate affect.   Gastrointestinal:  Soft, Non-tender, no distention  Skin: No rashes, No ecchymoses, No cyanosis	  Neurologic: Non-focal, Alert and oriented x 3  Extremities: Normal range of motion  Vascular: Peripheral pulses palpable 2+ bilaterally (radial)    LABS:  CBC Full  -  ( 15 Rod 2025 07:13 )  WBC Count : 7.13 K/uL  RBC Count : 3.98 M/uL  Hemoglobin : 12.8 g/dL  Hematocrit : 39.2 %  Platelet Count - Automated : 188 K/uL  Mean Cell Volume : 98.5 fl  Mean Cell Hemoglobin : 32.2 pg  Mean Cell Hemoglobin Concentration : 32.7 g/dL  Auto Neutrophil # : x  Auto Lymphocyte # : x  Auto Monocyte # : x  Auto Eosinophil # : x  Auto Basophil # : x  Auto Neutrophil % : x  Auto Lymphocyte % : x  Auto Monocyte % : x  Auto Eosinophil % : x  Auto Basophil % : x      01-15    137  |  108  |  26[H]  ----------------------------<  89  4.7   |  17[L]  |  1.42[H]    Ca    10.3      15 Rod 2025 07:13      IMPRESSION AND PLAN: 84F w HTN, HL, ?HCM on CMR, CVA, dementia, ?CAD here for elevated trop with abdominal pain. CP now resolved  -tele  -trop 104 - 63  -[facility] 12/2023 non-obs - rpt [facility] non-obs  -CMR 1/2024 w possible HCM  -TTE EF 45% w rwma  -cont asa, plavix  -cont losartan  -stable for dc w outpt cardiology      35 minutes were spent on this encounter for extensive review of medical record details including labs and/or imaging studies and/or adjacent care team and consultant records, as well as review and reconciliation of current medications. Time was spent on obtaining a history, performing physical examination of patient, and answering patient and/or family questions regarding plan of care. Time was also spent discussing plan of care with patient’s other care team members including primary and consulting teams. Time also was spent on documentation of this encounter into the EHR.    ***    Amari Richter MD, MPhil, [facility]  Cardiologist, [facility]  ; [employer] at [facility]  email: [email or web address] and Cardiovascular [facility] on-service contact/call information, go to [email or web address] and use [email or web address]" to login.  Outpatient Cardiology appointments, call  [phone] to arrange with a colleague; I do not have outpatient Cardiology clinic.

## 2025-01-15 NOTE — DISCHARGE NOTE PROVIDER - CARE PROVIDER_API CALL
Wendy Cole  Phone: (   )    -  Fax: (   )    -  Follow Up Time:     Darci Parra  Internal Medicine  [address] [place] [id number]  Phone: [phone]  Fax: [phone]  Follow Up Time: 1 week

## 2025-01-15 NOTE — DISCHARGE NOTE PROVIDER - ATTENDING DISCHARGE PHYSICAL EXAMINATION:
CONSTITUTIONAL: NAD  RESPIRATORY: clear to auscultation  CARDIOVASCULAR: +S1/S2. No LE swelling or edema.  ABDOMEN: Nontender, nondistended  MUSCULOSKELETAL: FARROM, no joint swelling or tenderness to palpation  PSYCH: AO x4 to person, place, time and event; affect appropriate

## 2025-01-15 NOTE — DISCHARGE NOTE PROVIDER - NSDCCPCAREPLAN_GEN_ALL_CORE_FT
PRINCIPAL DISCHARGE DIAGNOSIS  Diagnosis: NSTEMI (non-ST elevation myocardial infarction)  Assessment and Plan of Treatment: Continue your medications. Do not stop Aspirin or Plavix unless instructed by your cardiologist.  No heavy lifting, strenuous activity, bending, straining or unnecessary stair climbing for 2 weeks. No sex for 1 week.  No driving for 2 days. You may shower 24 hours following procedure but avoid baths and swimming for 1 week. Check groin site for bleeding and/or swelling daily following procedure. Call your doctor/cardiologist immediately for bleeding or swelling or if you have increased/persistent pain, fever/chills, or drainage at the site. Follow up with your cardiologist in 1- 2 weeks. You may call [facility] at [phone] or [phone] after office hours and weekends with any questions or concerns following your procedure.      SECONDARY DISCHARGE DIAGNOSES  Diagnosis: Prolonged QT interval  Assessment and Plan of Treatment:

## 2025-01-21 ENCOUNTER — EMERGENCY (EMERGENCY)
Facility: HOSPITAL | Age: 85
LOS: 1 days | Discharge: HOME | End: 2025-01-21
Attending: EMERGENCY MEDICINE | Admitting: EMERGENCY MEDICINE
Payer: MEDICARE

## 2025-01-21 VITALS
RESPIRATION RATE: 16 BRPM | SYSTOLIC BLOOD PRESSURE: 134 MMHG | OXYGEN SATURATION: 98 % | HEART RATE: 69 BPM | DIASTOLIC BLOOD PRESSURE: 79 MMHG | TEMPERATURE: 98 F

## 2025-01-21 VITALS
HEART RATE: 69 BPM | HEIGHT: 60 IN | SYSTOLIC BLOOD PRESSURE: 101 MMHG | OXYGEN SATURATION: 100 % | RESPIRATION RATE: 18 BRPM | TEMPERATURE: 98 F | WEIGHT: 162.92 LBS | DIASTOLIC BLOOD PRESSURE: 72 MMHG

## 2025-01-21 LAB
ALBUMIN SERPL ELPH-MCNC: 3.8 G/DL — SIGNIFICANT CHANGE UP (ref 3.3–5)
ALP SERPL-CCNC: 110 U/L — SIGNIFICANT CHANGE UP (ref 40–120)
ALT FLD-CCNC: 44 U/L — SIGNIFICANT CHANGE UP (ref 10–45)
ANION GAP SERPL CALC-SCNC: 14 MMOL/L — SIGNIFICANT CHANGE UP (ref 5–17)
APPEARANCE UR: CLEAR — SIGNIFICANT CHANGE UP
AST SERPL-CCNC: 51 U/L — HIGH (ref 10–40)
BASOPHILS # BLD AUTO: 0.03 K/UL — SIGNIFICANT CHANGE UP (ref 0–0.2)
BASOPHILS NFR BLD AUTO: 0.5 % — SIGNIFICANT CHANGE UP (ref 0–2)
BILIRUB SERPL-MCNC: 0.4 MG/DL — SIGNIFICANT CHANGE UP (ref 0.2–1.2)
BILIRUB UR-MCNC: NEGATIVE — SIGNIFICANT CHANGE UP
BUN SERPL-MCNC: 34 MG/DL — HIGH (ref 7–23)
CALCIUM SERPL-MCNC: 10.1 MG/DL — SIGNIFICANT CHANGE UP (ref 8.4–10.5)
CHLORIDE SERPL-SCNC: 104 MMOL/L — SIGNIFICANT CHANGE UP (ref 96–108)
CO2 SERPL-SCNC: 17 MMOL/L — LOW (ref 22–31)
COLOR SPEC: YELLOW — SIGNIFICANT CHANGE UP
CREAT SERPL-MCNC: 1.52 MG/DL — HIGH (ref 0.5–1.3)
DIFF PNL FLD: NEGATIVE — SIGNIFICANT CHANGE UP
EGFR: 34 ML/MIN/1.73M2 — LOW
EOSINOPHIL # BLD AUTO: 0.07 K/UL — SIGNIFICANT CHANGE UP (ref 0–0.5)
EOSINOPHIL NFR BLD AUTO: 1.3 % — SIGNIFICANT CHANGE UP (ref 0–6)
GLUCOSE SERPL-MCNC: 84 MG/DL — SIGNIFICANT CHANGE UP (ref 70–99)
GLUCOSE UR QL: 500 MG/DL
HCT VFR BLD CALC: 38.7 % — SIGNIFICANT CHANGE UP (ref 34.5–45)
HGB BLD-MCNC: 13.2 G/DL — SIGNIFICANT CHANGE UP (ref 11.5–15.5)
IMM GRANULOCYTES NFR BLD AUTO: 0.9 % — SIGNIFICANT CHANGE UP (ref 0–0.9)
KETONES UR-MCNC: NEGATIVE MG/DL — SIGNIFICANT CHANGE UP
LEUKOCYTE ESTERASE UR-ACNC: ABNORMAL
LIDOCAIN IGE QN: 80 U/L — HIGH (ref 7–60)
LYMPHOCYTES # BLD AUTO: 1.3 K/UL — SIGNIFICANT CHANGE UP (ref 1–3.3)
LYMPHOCYTES # BLD AUTO: 23.4 % — SIGNIFICANT CHANGE UP (ref 13–44)
MCHC RBC-ENTMCNC: 31.9 PG — SIGNIFICANT CHANGE UP (ref 27–34)
MCHC RBC-ENTMCNC: 34.1 G/DL — SIGNIFICANT CHANGE UP (ref 32–36)
MCV RBC AUTO: 93.5 FL — SIGNIFICANT CHANGE UP (ref 80–100)
MONOCYTES # BLD AUTO: 0.84 K/UL — SIGNIFICANT CHANGE UP (ref 0–0.9)
MONOCYTES NFR BLD AUTO: 15.1 % — HIGH (ref 2–14)
NEUTROPHILS # BLD AUTO: 3.27 K/UL — SIGNIFICANT CHANGE UP (ref 1.8–7.4)
NEUTROPHILS NFR BLD AUTO: 58.8 % — SIGNIFICANT CHANGE UP (ref 43–77)
NITRITE UR-MCNC: NEGATIVE — SIGNIFICANT CHANGE UP
NRBC # BLD: 0 /100 WBCS — SIGNIFICANT CHANGE UP (ref 0–0)
PH UR: 6 — SIGNIFICANT CHANGE UP (ref 5–8)
PLATELET # BLD AUTO: 220 K/UL — SIGNIFICANT CHANGE UP (ref 150–400)
POTASSIUM SERPL-MCNC: 5 MMOL/L — SIGNIFICANT CHANGE UP (ref 3.5–5.3)
POTASSIUM SERPL-SCNC: 5 MMOL/L — SIGNIFICANT CHANGE UP (ref 3.5–5.3)
PROT SERPL-MCNC: 6.8 G/DL — SIGNIFICANT CHANGE UP (ref 6–8.3)
PROT UR-MCNC: NEGATIVE MG/DL — SIGNIFICANT CHANGE UP
RBC # BLD: 4.14 M/UL — SIGNIFICANT CHANGE UP (ref 3.8–5.2)
RBC # FLD: 13.6 % — SIGNIFICANT CHANGE UP (ref 10.3–14.5)
SODIUM SERPL-SCNC: 135 MMOL/L — SIGNIFICANT CHANGE UP (ref 135–145)
SP GR SPEC: 1.01 — SIGNIFICANT CHANGE UP (ref 1–1.03)
TROPONIN T, HIGH SENSITIVITY RESULT: 28 NG/L — SIGNIFICANT CHANGE UP (ref 0–51)
UROBILINOGEN FLD QL: 0.2 MG/DL — SIGNIFICANT CHANGE UP (ref 0.2–1)
WBC # BLD: 5.56 K/UL — SIGNIFICANT CHANGE UP (ref 3.8–10.5)
WBC # FLD AUTO: 5.56 K/UL — SIGNIFICANT CHANGE UP (ref 3.8–10.5)

## 2025-01-21 PROCEDURE — 71045 X-RAY EXAM CHEST 1 VIEW: CPT

## 2025-01-21 PROCEDURE — 84484 ASSAY OF TROPONIN QUANT: CPT

## 2025-01-21 PROCEDURE — 81001 URINALYSIS AUTO W/SCOPE: CPT

## 2025-01-21 PROCEDURE — 74177 CT ABD & PELVIS W/CONTRAST: CPT | Mod: 26

## 2025-01-21 PROCEDURE — 71045 X-RAY EXAM CHEST 1 VIEW: CPT | Mod: 26

## 2025-01-21 PROCEDURE — 87086 URINE CULTURE/COLONY COUNT: CPT

## 2025-01-21 PROCEDURE — 99285 EMERGENCY DEPT VISIT HI MDM: CPT

## 2025-01-21 PROCEDURE — 83690 ASSAY OF LIPASE: CPT

## 2025-01-21 PROCEDURE — 74177 CT ABD & PELVIS W/CONTRAST: CPT | Mod: MC

## 2025-01-21 PROCEDURE — 85025 COMPLETE CBC W/AUTO DIFF WBC: CPT

## 2025-01-21 PROCEDURE — 99284 EMERGENCY DEPT VISIT MOD MDM: CPT | Mod: 25

## 2025-01-21 PROCEDURE — 70450 CT HEAD/BRAIN W/O DYE: CPT | Mod: MC

## 2025-01-21 PROCEDURE — 80053 COMPREHEN METABOLIC PANEL: CPT

## 2025-01-21 PROCEDURE — 36415 COLL VENOUS BLD VENIPUNCTURE: CPT

## 2025-01-21 PROCEDURE — 96374 THER/PROPH/DIAG INJ IV PUSH: CPT | Mod: XU

## 2025-01-21 PROCEDURE — 70450 CT HEAD/BRAIN W/O DYE: CPT | Mod: 26

## 2025-01-21 RX ORDER — SODIUM CHLORIDE 9 MG/ML
500 INJECTION, SOLUTION INTRAMUSCULAR; INTRAVENOUS; SUBCUTANEOUS ONCE
Refills: 0 | Status: COMPLETED | OUTPATIENT
Start: 2025-01-21 | End: 2025-01-21

## 2025-01-21 RX ORDER — MORPHINE SULFATE 15 MG
2 TABLET, EXTENDED RELEASE ORAL ONCE
Refills: 0 | Status: DISCONTINUED | OUTPATIENT
Start: 2025-01-21 | End: 2025-01-21

## 2025-01-21 RX ORDER — IOHEXOL 350 MG/ML
30 INJECTION, SOLUTION INTRAVENOUS ONCE
Refills: 0 | Status: COMPLETED | OUTPATIENT
Start: 2025-01-21 | End: 2025-01-21

## 2025-01-21 RX ORDER — ACETAMINOPHEN 80 MG/.8ML
650 SOLUTION/ DROPS ORAL ONCE
Refills: 0 | Status: DISCONTINUED | OUTPATIENT
Start: 2025-01-21 | End: 2025-01-21

## 2025-01-21 RX ADMIN — IOHEXOL 30 MILLILITER(S): 350 INJECTION, SOLUTION INTRAVENOUS at 15:44

## 2025-01-21 RX ADMIN — SODIUM CHLORIDE 500 MILLILITER(S): 9 INJECTION, SOLUTION INTRAMUSCULAR; INTRAVENOUS; SUBCUTANEOUS at 16:06

## 2025-01-21 RX ADMIN — Medication 2 MILLIGRAM(S): at 15:44

## 2025-01-21 NOTE — ED PROVIDER NOTE - PROGRESS NOTE DETAILS
[place]: bicarb 17 (unchanged from prior), BUN/cr 34/1.52 (was 26/1.42), AST 51, lipase 80, trop 28 (prior trops were 104-->63), other labs grossly wnl. Clinically not ACS.  CXR showed "Retrocardiac opacity may represent hiatal hernia. Lungs otherwise clear. No acute bone abnormality. No pneumothorax. Vascular calcification thoracic aorta." Clinically very unlikely PNA.   UA small leuk esterase, unlikely UTI.   CTs pending. Will reassess. [place]: CT a/p showed "IMPRESSION: 1. Large hiatal hernia with intrathoracic stomach. 2. Multiple colonic diverticula. 3. No evidence of colitis, mass, or obstruction."  Now pain free, tolerating PO. Benign exam.   Abd symptoms possibly 2/2 large hiatal hernia.   CTH results pending. Updated pt/daughter. Likely outpt f/u. [place]: CTH w/ no acute pathology. Discussed importance of outpt follow up and return precautions. Clinically no indication for further emergent ED workup or hospitalization at this time. Stable for dc, outpt f/u.

## 2025-01-21 NOTE — ED PROVIDER NOTE - OBJECTIVE STATEMENT
Was admitted at [facility] 1/10-15 - has alternate chart under same name  Prydeinig, prefers daughter at bedside to translate.     84F PMH HTN, dementia, GERD, anemia, HLD, possible HCM (cardiac MRI Jan 2024), EF 45%, large hiatal hernia p/w several symptoms. Had presented to other ER w/ several weeks of malaise, SOB/SINCLAIR, epigastric pain/abnormal EKG. Labs/EKG were concerning for NSTEMI and was transferred to [facility], s/p diagnostic cardiac cath on 1/14, "nonobstructive coronary artery disease." Pt still having the same symptoms - has intermittent epigastric/periumbilical pain. Also intermittent generalized HA/vague dizziness/lightheaded (daughter states this was present during last hospitalization as well). Worsening generalized weakness and worsening appetite. No other systemic symptoms.   Denies vision changes, focal weakness/numbness, neck pain, tinnitus, hearing changes, URI symptoms, f/c, CP, NVD, urinary complaints, black/bloody stool.

## 2025-01-21 NOTE — ED ADULT TRIAGE NOTE - CHIEF COMPLAINT QUOTE
"She was admitted here last week for a  NSTEMI. Her pain in her upper abdomen and headaches have not gotten better." endorsing cp. denies sob, fevers, chills, nvd. AAOx3. Ambulatory with cane. "She was admitted here last week for a  NSTEMI from the 10-14th. Her pain in her upper abdomen and headaches have not gotten better." endorsing cp, sob, and dizziness. denies fevers, chills, nvd. AAOx3. Ambulatory with cane. Clinical upgrade for abnormal ekg to Dr. Hernandez. EKG done "She was admitted here last week for a  NSTEMI from the 10-14th. Her pain in her upper abdomen and headaches have not gotten better." endorsing cp, back pain, sob, and dizziness. denies fevers, chills, nvd. AAOx3. Ambulatory with cane. Clinical upgrade for abnormal ekg to Dr. Hernandez. EKG done

## 2025-01-21 NOTE — ED PROVIDER NOTE - PHYSICAL EXAMINATION
no LE edema  PERRL, EOMI, no nystagmus. CN intact. Strength No pronator drift. Sensation intact. Normal speech, no dysarthria.

## 2025-01-21 NOTE — ED ADULT NURSE NOTE - OBJECTIVE STATEMENT
83 y/o F c/o intermittent epigastric pain, lightheadedness, weakness, worsening appetite. Pt denies chest pain, SOB, N/V/D, urinary complaints, black/bloody stool, numbness, tingling. Pt A&Ox4, respirations even and unlabored, skin color WDL warm and dry. No acute distress observed.

## 2025-01-21 NOTE — ED PROVIDER NOTE - CARE PLAN
Principal Discharge DX:	Abdominal pain  Secondary Diagnosis:	Headache  Secondary Diagnosis:	Hiatal hernia   1

## 2025-01-21 NOTE — ED PROVIDER NOTE - PSYCHIATRIC, MLM
Spoke with patient, advised of results of echo and stress test. Patient states his symptoms are not the same as prior to his previous stents he had. Advised to notify office if shortness of breath, chest pain would happen again or get worse as well as if feels symptoms of prior to his stents placed.Patient verbalizes understanding and agrees with plan of care.      Alert and oriented to person, place, time/situation. normal mood and affect. no apparent risk to self or others.

## 2025-01-21 NOTE — ED PROVIDER NOTE - NSFOLLOWUPINSTRUCTIONS_ED_ALL_ED_FT
It is unclear what exactly is causing your symptoms! It is important to continue following up with your doctor outside the hospital and to return to ER for: Persistent fever/vomiting, uncontrolled pain, worsening swelling, worsening breathing, worsening lightheaded, spreading redness.     Can take tylenol 650mg every 6hrs as needed for pain.    Follow up with primary doctor within 1-2 days.     Follow up with neurologist for your headaches. Can call [phone] to schedule appointment.     Follow up with thoracic surgeon for your hiatal hernia.    Continue to follow up with your cardiologist as soon as possible.     A headache is pain or discomfort felt around the head or neck area. The specific cause of a headache may not be found. There are many causes and types of headaches. A few common ones are:  Tension headaches.  Migraine headaches.  Cluster headaches.  Chronic daily headaches.  Follow these instructions at home:  Watch your condition for any changes.     Take these steps to help with your condition:  Managing pain   Take over-the-counter and prescription medicines only as told by your health care provider.  Lie down in a dark, quiet room when you have a headache.  If directed, apply ice to the head and neck area:  Put ice in a plastic bag.  Place a towel between your skin and the bag.  Leave the ice on for 20 minutes, 2–3 times per day.  Use a heating pad or hot shower to apply heat to the head and neck area as told by your health care provider.  Keep lights dim if bright lights bother you or make your headaches worse.    Eating and drinking   Eat meals on a regular schedule.  Limit alcohol use.  Decrease the amount of caffeine you drink, or stop drinking caffeine.    General instructions      Keep all follow-up visits as told by your health care provider. This is important.  Keep a headache journal to help find out what may trigger your headaches. For example, write down:  What you eat and drink.  How much sleep you get.  Any change to your diet or medicines.  Try massage or other relaxation techniques.  Limit stress.  Sit up straight, and do not tense your muscles.  Do not use tobacco products, including cigarettes, chewing tobacco, or e-cigarettes. If you need help quitting, ask your health care provider.  Exercise regularly as told by your health care provider.  Sleep on a regular schedule. Get 7–9 hours of sleep, or the amount recommended by your health care provider.    Contact a health care provider if:  Your symptoms are not helped by medicine.  You have a headache that is different from the usual headache.  You have nausea or you vomit.  You have a fever.  Get help right away if:  Your headache becomes severe.  You have repeated vomiting.  You have a stiff neck.  You have a loss of vision.  You have problems with speech.  You have pain in the eye or ear.  You have muscular weakness or loss of muscle control.  You lose your balance or have trouble walking.  You feel faint or pass out.  You have confusion. It is unclear what exactly is causing your symptoms! It is important to continue following up with your doctor outside the hospital and to return to ER for: Persistent fever/vomiting, uncontrolled pain, worsening swelling, worsening breathing, worsening lightheaded, spreading redness.     Can take tylenol 650mg every 6hrs as needed for pain.    Follow up with primary doctor within 1-2 days.     Follow up with neurologist for your headaches. Can call [phone] to schedule appointment.     Follow up with thoracic surgeon for your hiatal hernia. Can call [phone] to schedule appointment.     Continue to follow up with your cardiologist as soon as possible.     A headache is pain or discomfort felt around the head or neck area. The specific cause of a headache may not be found. There are many causes and types of headaches. A few common ones are:  Tension headaches.  Migraine headaches.  Cluster headaches.  Chronic daily headaches.  Follow these instructions at home:  Watch your condition for any changes.     Take these steps to help with your condition:  Managing pain   Take over-the-counter and prescription medicines only as told by your health care provider.  Lie down in a dark, quiet room when you have a headache.  If directed, apply ice to the head and neck area:  Put ice in a plastic bag.  Place a towel between your skin and the bag.  Leave the ice on for 20 minutes, 2–3 times per day.  Use a heating pad or hot shower to apply heat to the head and neck area as told by your health care provider.  Keep lights dim if bright lights bother you or make your headaches worse.    Eating and drinking   Eat meals on a regular schedule.  Limit alcohol use.  Decrease the amount of caffeine you drink, or stop drinking caffeine.    General instructions      Keep all follow-up visits as told by your health care provider. This is important.  Keep a headache journal to help find out what may trigger your headaches. For example, write down:  What you eat and drink.  How much sleep you get.  Any change to your diet or medicines.  Try massage or other relaxation techniques.  Limit stress.  Sit up straight, and do not tense your muscles.  Do not use tobacco products, including cigarettes, chewing tobacco, or e-cigarettes. If you need help quitting, ask your health care provider.  Exercise regularly as told by your health care provider.  Sleep on a regular schedule. Get 7–9 hours of sleep, or the amount recommended by your health care provider.    Contact a health care provider if:  Your symptoms are not helped by medicine.  You have a headache that is different from the usual headache.  You have nausea or you vomit.  You have a fever.  Get help right away if:  Your headache becomes severe.  You have repeated vomiting.  You have a stiff neck.  You have a loss of vision.  You have problems with speech.  You have pain in the eye or ear.  You have muscular weakness or loss of muscle control.  You lose your balance or have trouble walking.  You feel faint or pass out.  You have confusion.

## 2025-01-21 NOTE — ED PROVIDER NOTE - CLINICAL SUMMARY MEDICAL DECISION MAKING FREE TEXT BOX
84F PMH HTN, dementia, GERD, anemia, HLD, possible HCM (cardiac MRI Jan 2024), EF 45%, large hiatal hernia p/w several symptoms. Had presented to other ER w/ several weeks of malaise, SOB/[place], epigastric pain/abnormal EKG. Labs/EKG were concerning for NSTEMI and was transferred to [facility], s/p diagnostic cardiac cath on 1/14, "nonobstructive coronary artery disease." Pt still having the same symptoms - has intermittent epigastric/periumbilical pain. Also intermittent generalized HA/vague dizziness/lightheaded (daughter states this was present during last hospitalization as well). Worsening generalized weakness and worsening appetite. No other systemic symptoms.   Took tylenol ~3hrs PTA.  Vitals wnl, exam as above. Well appearing. Benign abdomen.   ddx: EKG is unchanged. Has recent fairly extensive cardiac w/u. Low suspicion for acute significant cardiac etiology. Possibly related to hiatal hernia (daughter states they were never told she has one). Possible GERD/gastritis/PUD. Lower suspicion other significant intra-abd pathology.   HA: Likely benign HA. Low suspicion mass. Clinically not infectious/meningitis, temporal arteritis, thrombosis, glaucoma, large mass, TTP, hyperviscosity syndrome, idiopathic intracranial HTN.   Will check labs, CXR, CT a/p, CTH, IVF, attempt symptom control, reassess.

## 2025-01-21 NOTE — ED PROVIDER NOTE - PATIENT PORTAL LINK FT
You can access the FollowMyHealth Patient Portal offered by [facility] by registering at the following website: [email or web address]. By joining [employer]’s FollowMyHealth portal, you will also be able to view your health information using other applications (apps) compatible with our system.

## 2025-01-21 NOTE — ED ADULT NURSE NOTE - CHIEF COMPLAINT QUOTE
"She was admitted here last week for a  NSTEMI from the 10-14th. Her pain in her upper abdomen and headaches have not gotten better." endorsing cp, back pain, sob, and dizziness. denies fevers, chills, nvd. AAOx3. Ambulatory with cane. Clinical upgrade for abnormal ekg to Dr. Hernandez. EKG done

## 2025-01-22 DIAGNOSIS — F03.90 UNSPECIFIED DEMENTIA W/OUT BEHAVIORAL DISTURBANCE: ICD-10-CM

## 2025-01-22 DIAGNOSIS — E11.9 TYPE 2 DIABETES MELLITUS W/OUT COMPLICATIONS: ICD-10-CM

## 2025-01-22 DIAGNOSIS — K44.9 DIAPHRAGMATIC HERNIA W/OUT OBSTRUCTION OR GANGRENE: ICD-10-CM

## 2025-01-22 DIAGNOSIS — K21.9 GASTRO-ESOPHAGEAL REFLUX DISEASE W/OUT ESOPHAGITIS: ICD-10-CM

## 2025-01-22 DIAGNOSIS — E78.5 HYPERLIPIDEMIA, UNSPECIFIED: ICD-10-CM

## 2025-01-22 DIAGNOSIS — I10 ESSENTIAL (PRIMARY) HYPERTENSION: ICD-10-CM

## 2025-01-22 DIAGNOSIS — I63.9 CEREBRAL INFARCTION, UNSPECIFIED: ICD-10-CM

## 2025-01-22 DIAGNOSIS — D64.9 ANEMIA, UNSPECIFIED: ICD-10-CM

## 2025-01-22 PROBLEM — Z00.00 ENCOUNTER FOR PREVENTIVE HEALTH EXAMINATION: Status: ACTIVE | Noted: 2025-01-22

## 2025-01-22 LAB
CULTURE RESULTS: SIGNIFICANT CHANGE UP
SPECIMEN SOURCE: SIGNIFICANT CHANGE UP

## 2025-01-22 RX ORDER — LOSARTAN POTASSIUM 50 MG/1
50 TABLET, FILM COATED ORAL DAILY
Refills: 0 | Status: ACTIVE | COMMUNITY
Start: 2025-01-22

## 2025-01-22 RX ORDER — ASPIRIN 81 MG/1
81 TABLET, COATED ORAL DAILY
Refills: 0 | Status: ACTIVE | COMMUNITY
Start: 2025-01-22

## 2025-01-22 RX ORDER — EZETIMIBE 10 MG/1
10 TABLET ORAL DAILY
Refills: 0 | Status: ACTIVE | COMMUNITY
Start: 2025-01-22

## 2025-01-22 RX ORDER — AMIODARONE HYDROCHLORIDE 200 MG/1
200 TABLET ORAL DAILY
Refills: 0 | Status: ACTIVE | COMMUNITY
Start: 2025-01-22

## 2025-01-22 RX ORDER — ROSUVASTATIN CALCIUM 40 MG/1
40 TABLET, FILM COATED ORAL DAILY
Refills: 0 | Status: ACTIVE | COMMUNITY
Start: 2025-01-22

## 2025-01-22 RX ORDER — CARVEDILOL 12.5 MG/1
12.5 TABLET, FILM COATED ORAL TWICE DAILY
Refills: 0 | Status: ACTIVE | COMMUNITY
Start: 2025-01-22

## 2025-01-22 RX ORDER — EMPAGLIFLOZIN 10 MG/1
10 TABLET, FILM COATED ORAL DAILY
Refills: 0 | Status: ACTIVE | COMMUNITY
Start: 2025-01-22

## 2025-01-22 RX ORDER — ICOSAPENT ETHYL 1000 MG/1
1 CAPSULE ORAL
Refills: 0 | Status: ACTIVE | COMMUNITY
Start: 2025-01-22

## 2025-01-22 RX ORDER — CLOPIDOGREL BISULFATE 75 MG/1
75 TABLET, FILM COATED ORAL DAILY
Refills: 0 | Status: ACTIVE | COMMUNITY
Start: 2025-01-22

## 2025-01-25 DIAGNOSIS — K44.9 DIAPHRAGMATIC HERNIA WITHOUT OBSTRUCTION OR GANGRENE: ICD-10-CM

## 2025-01-25 DIAGNOSIS — R53.1 WEAKNESS: ICD-10-CM

## 2025-01-25 DIAGNOSIS — D64.9 ANEMIA, UNSPECIFIED: ICD-10-CM

## 2025-01-25 DIAGNOSIS — K21.9 GASTRO-ESOPHAGEAL REFLUX DISEASE WITHOUT ESOPHAGITIS: ICD-10-CM

## 2025-01-25 DIAGNOSIS — R42 DIZZINESS AND GIDDINESS: ICD-10-CM

## 2025-01-25 DIAGNOSIS — R10.13 EPIGASTRIC PAIN: ICD-10-CM

## 2025-01-25 DIAGNOSIS — F03.90 UNSPECIFIED DEMENTIA, UNSPECIFIED SEVERITY, WITHOUT BEHAVIORAL DISTURBANCE, PSYCHOTIC DISTURBANCE, MOOD DISTURBANCE, AND ANXIETY: ICD-10-CM

## 2025-01-25 DIAGNOSIS — I10 ESSENTIAL (PRIMARY) HYPERTENSION: ICD-10-CM

## 2025-01-25 DIAGNOSIS — I25.10 ATHEROSCLEROTIC HEART DISEASE OF NATIVE CORONARY ARTERY WITHOUT ANGINA PECTORIS: ICD-10-CM

## 2025-01-25 DIAGNOSIS — K57.30 DIVERTICULOSIS OF LARGE INTESTINE WITHOUT PERFORATION OR ABSCESS WITHOUT BLEEDING: ICD-10-CM

## 2025-01-25 DIAGNOSIS — R51.9 HEADACHE, UNSPECIFIED: ICD-10-CM

## 2025-01-25 DIAGNOSIS — E78.5 HYPERLIPIDEMIA, UNSPECIFIED: ICD-10-CM

## 2025-01-30 ENCOUNTER — APPOINTMENT (OUTPATIENT)
Dept: THORACIC SURGERY | Facility: CLINIC | Age: 85
End: 2025-01-30

## 2025-02-13 ENCOUNTER — APPOINTMENT (OUTPATIENT)
Dept: THORACIC SURGERY | Facility: CLINIC | Age: 85
End: 2025-02-13
Payer: MEDICARE

## 2025-02-13 ENCOUNTER — NON-APPOINTMENT (OUTPATIENT)
Age: 85
End: 2025-02-13

## 2025-02-13 VITALS
OXYGEN SATURATION: 95 % | HEIGHT: 59 IN | BODY MASS INDEX: 32.25 KG/M2 | TEMPERATURE: 97.6 F | SYSTOLIC BLOOD PRESSURE: 115 MMHG | WEIGHT: 160 LBS | HEART RATE: 70 BPM | DIASTOLIC BLOOD PRESSURE: 72 MMHG

## 2025-02-13 DIAGNOSIS — K44.9 DIAPHRAGMATIC HERNIA W/OUT OBSTRUCTION OR GANGRENE: ICD-10-CM

## 2025-02-13 PROCEDURE — 99203 OFFICE O/P NEW LOW 30 MIN: CPT

## 2025-02-14 RX ORDER — FOLIC ACID 1 MG/1
1 TABLET ORAL DAILY
Refills: 0 | Status: ACTIVE | COMMUNITY
Start: 2025-02-14

## 2025-02-14 RX ORDER — OMEPRAZOLE 40 MG/1
40 CAPSULE, DELAYED RELEASE ORAL DAILY
Refills: 0 | Status: ACTIVE | COMMUNITY
Start: 2025-02-14

## 2025-02-14 RX ORDER — ALBUTEROL SULFATE 90 UG/1
108 (90 BASE) INHALANT RESPIRATORY (INHALATION)
Refills: 0 | Status: ACTIVE | COMMUNITY
Start: 2025-02-14

## 2025-02-14 RX ORDER — MECLIZINE HYDROCHLORIDE 12.5 MG/1
12.5 TABLET ORAL DAILY
Refills: 0 | Status: ACTIVE | COMMUNITY
Start: 2025-02-14

## 2025-02-14 RX ORDER — ELECTROLYTES/DEXTROSE
SOLUTION, ORAL ORAL DAILY
Refills: 0 | Status: ACTIVE | COMMUNITY
Start: 2025-02-14

## 2025-02-14 RX ORDER — MEGESTROL ACETATE 40 MG/1
40 TABLET ORAL DAILY
Refills: 0 | Status: ACTIVE | COMMUNITY
Start: 2025-02-14

## 2025-02-14 RX ORDER — TORSEMIDE 5 MG/1
5 TABLET ORAL DAILY
Refills: 0 | Status: ACTIVE | COMMUNITY
Start: 2025-02-14

## 2025-02-14 RX ORDER — METFORMIN HYDROCHLORIDE 500 MG/1
500 TABLET, COATED ORAL DAILY
Refills: 0 | Status: ACTIVE | COMMUNITY
Start: 2025-02-14

## 2025-02-14 RX ORDER — BENZONATATE 100 MG/1
100 CAPSULE ORAL EVERY 8 HOURS
Refills: 0 | Status: ACTIVE | COMMUNITY
Start: 2025-02-14

## 2025-03-26 ENCOUNTER — NON-APPOINTMENT (OUTPATIENT)
Age: 85
End: 2025-03-26

## 2025-06-06 ENCOUNTER — APPOINTMENT (OUTPATIENT)
Dept: RADIOLOGY | Facility: HOSPITAL | Age: 85
End: 2025-06-06
Payer: MEDICAID

## 2025-06-06 ENCOUNTER — OUTPATIENT (OUTPATIENT)
Dept: OUTPATIENT SERVICES | Facility: HOSPITAL | Age: 85
LOS: 1 days | End: 2025-06-06
Payer: MEDICARE

## 2025-06-06 PROCEDURE — 74240 X-RAY XM UPR GI TRC 1CNTRST: CPT | Mod: 26

## 2025-06-06 PROCEDURE — 74240 X-RAY XM UPR GI TRC 1CNTRST: CPT

## 2025-06-12 ENCOUNTER — APPOINTMENT (OUTPATIENT)
Dept: THORACIC SURGERY | Facility: CLINIC | Age: 85
End: 2025-06-12
Payer: MEDICARE

## 2025-06-12 ENCOUNTER — NON-APPOINTMENT (OUTPATIENT)
Age: 85
End: 2025-06-12

## 2025-06-12 VITALS
HEIGHT: 59 IN | BODY MASS INDEX: 32.25 KG/M2 | SYSTOLIC BLOOD PRESSURE: 119 MMHG | WEIGHT: 160 LBS | DIASTOLIC BLOOD PRESSURE: 57 MMHG | TEMPERATURE: 97.7 F | OXYGEN SATURATION: 97 % | HEART RATE: 68 BPM

## 2025-06-12 PROCEDURE — 99213 OFFICE O/P EST LOW 20 MIN: CPT
